# Patient Record
Sex: FEMALE | Race: WHITE | NOT HISPANIC OR LATINO | Employment: OTHER | URBAN - METROPOLITAN AREA
[De-identification: names, ages, dates, MRNs, and addresses within clinical notes are randomized per-mention and may not be internally consistent; named-entity substitution may affect disease eponyms.]

---

## 2017-03-01 ENCOUNTER — MEDICAL CORRESPONDENCE (OUTPATIENT)
Dept: HEALTH INFORMATION MANAGEMENT | Facility: CLINIC | Age: 72
End: 2017-03-01

## 2017-03-02 ENCOUNTER — TRANSFERRED RECORDS (OUTPATIENT)
Dept: HEALTH INFORMATION MANAGEMENT | Facility: CLINIC | Age: 72
End: 2017-03-02

## 2017-03-02 ENCOUNTER — MEDICAL CORRESPONDENCE (OUTPATIENT)
Dept: HEALTH INFORMATION MANAGEMENT | Facility: CLINIC | Age: 72
End: 2017-03-02

## 2017-03-07 ENCOUNTER — TRANSFERRED RECORDS (OUTPATIENT)
Dept: HEALTH INFORMATION MANAGEMENT | Facility: CLINIC | Age: 72
End: 2017-03-07

## 2017-03-16 ENCOUNTER — OFFICE VISIT (OUTPATIENT)
Dept: FAMILY MEDICINE | Facility: CLINIC | Age: 72
End: 2017-03-16
Payer: MEDICARE

## 2017-03-16 VITALS
HEART RATE: 75 BPM | TEMPERATURE: 99.1 F | DIASTOLIC BLOOD PRESSURE: 76 MMHG | BODY MASS INDEX: 30.81 KG/M2 | WEIGHT: 188 LBS | SYSTOLIC BLOOD PRESSURE: 118 MMHG | OXYGEN SATURATION: 96 % | RESPIRATION RATE: 16 BRPM

## 2017-03-16 DIAGNOSIS — R07.0 THROAT PAIN: Primary | ICD-10-CM

## 2017-03-16 DIAGNOSIS — J31.0 CHRONIC RHINITIS: ICD-10-CM

## 2017-03-16 PROCEDURE — 99213 OFFICE O/P EST LOW 20 MIN: CPT | Performed by: FAMILY MEDICINE

## 2017-03-16 RX ORDER — POLYETHYLENE GLYCOL 3350 17 G/17G
POWDER, FOR SOLUTION ORAL
Refills: 11 | COMMUNITY
Start: 2017-01-20

## 2017-03-16 NOTE — PROGRESS NOTES
SUBJECTIVE:                                                    Nohemy Gurrola is a 71 year old female who presents to clinic today for the following health issues:    Acute Illness   Acute illness concerns: sore throat, post nasal drip/irritation, occasional sneezing  Onset: x few weeks    Fever: no    Chills/Sweats: no    Headache (location?): no    Sinus Pressure: a lot of post nasal drip    Conjunctivitis:  no    Ear Pain: no    Rhinorrhea: no    Congestion: no    Sore Throat: YES     Cough: no    Wheeze: no    Decreased Appetite: no    Nausea: no    Vomiting: no    Diarrhea:  no    Dysuria/Freq.: no    Fatigue/Achiness: YES- some fatigue    Sick/Strep Exposure: no     Therapies Tried and outcome: Flonase - helps at night, mucous relief, antihistamine     [facility].  She takes it at night and feels it helps a bit but overall she still has sore throat.   No sinus pain.    Problem list and histories reviewed & adjusted, as indicated.  Additional history: as documented    Patient Active Problem List   Diagnosis     Primary biliary cholangitis (H)     Ovarian cancer (H)     Major depression     Insomnia     Hypertension     Hyperlipidemia     Heartburn     Past Surgical History   Procedure Laterality Date     As total abdom hysterectomy  1985     for endometriosis and fibroids     Salpingo-oophorectomy bilateral Bilateral 1985     Cholecystectomy  2004     Laparotomy exploratory  2004     for ovarian cancer     Laparoscopy  2009     for recurrent ovarian cancer     Open reduction internal fixation ankle Left 1980       Social History   Substance Use Topics     Smoking status: Never Smoker     Smokeless tobacco: Never Used     Alcohol use No     Family History   Problem Relation Age of Onset     Bipolar Disorder Mother      Suicide Mother          BP Readings from Last 3 Encounters:   03/16/17 118/76   09/03/16 116/74    Wt Readings from Last 3 Encounters:   03/16/17 188 lb (85.3 kg)    09/03/16 198 lb (89.8 kg)              Reviewed and updated as needed this visit by clinical staff  Tobacco  Allergies  Meds  Problems  Med Hx  Surg Hx  Fam Hx  Soc Hx        Reviewed and updated as needed this visit by Provider  Allergies  Meds  Problems  Med Hx  Surg Hx  Fam Hx         ROS:  ALL: No itchy eyes/nose/ears    OBJECTIVE:                                                    /76 (BP Location: Right arm, Patient Position: Chair, Cuff Size: Adult Regular)  Pulse 75  Temp 99.1  F (37.3  C) (Oral)  Resp 16  Wt 188 lb (85.3 kg)  SpO2 96%  BMI 30.81 kg/m2  Body mass index is 30.81 kg/(m^2).  GEN:  no apparent distress  EYES: PERRL, conjunctivae and sclerae clear  ENT: external ears and nose without lesions or scars, TM's and canals clear bilaterally, oropharynx with post-nasal drainage, no tonsillar enlargement, erythema, or exudate, and with moist mucus membranes and normal landmarks, nasal mucosa appears red and swollen   NECK:  Supple without adenopathy, mass, or thyromegaly  LUNGS:  normal respiratory effort, and lungs clear to auscultation bilaterally - no rales, rhonchi or wheezes  CV: regular rate and rhythm, normal S1 S2, no S3 or S4 and no murmur, click or rub     Diagnostic Test Results:  none      ASSESSMENT/PLAN:                                                            ICD-10-CM    1. Throat pain R07.0    2. Chronic rhinitis J31.0       Discussed that this this is still likely 2/2 chronic rhinitis with post-nasal drainage.  No evidence for strep or sinus infection.  Recommended she increase flonase use to 1 spray in each nostril BID.  If symptoms persist consider ENT referral - either through [facility] or in community.      Lalita Mendoza MD  [facility]

## 2017-03-16 NOTE — MR AVS SNAPSHOT
After Visit Summary   3/16/2017    Nohemy Gurrola    MRN: [id number]           Patient Information     Date Of Birth          1945        Visit Information        Provider Department      3/16/2017 3:00 PM Lalita Mendoza MD [facility]        Today's Diagnoses     Throat pain    -  1    Chronic rhinitis           Follow-ups after your visit        Your next 10 appointments already scheduled     Mar 30, 2017  1:00 PM CDT   (Arrive by 12:45 PM)   New Patient Visit with Evi Boyd MD   [facility] ([facility])    [address] [place] [id number] [phone]              Who to contact     If you have questions or need follow up information about today's clinic visit or your schedule please contact [facility] directly at [phone].  Normal or non-critical lab and imaging results will be communicated to you by MyChart, letter or phone within 4 business days after the clinic has received the results. If you do not hear from us within 7 days, please contact the clinic through MyChart or phone. If you have a critical or abnormal lab result, we will notify you by phone as soon as possible.  Submit refill requests through [employer] or call your pharmacy and they will forward the refill request to us. Please allow 3 business days for your refill to be completed.          Additional Information About Your Visit        MyChart Information     [employer] gives you secure access to your electronic health record. If you see a primary care provider, you can also send messages to your care team and make appointments. If you have questions, please call your primary care clinic.  If you do not have a primary care provider, please call [phone] and they will assist you.        Care EveryWhere ID     This is your Care EveryWhere ID. This could be used by other organizations to access your [place]  medical records  [id number]        Your Vitals Were     Pulse Temperature Respirations Pulse Oximetry BMI (Body Mass Index)       75 99.1  F (37.3  C) (Oral) 16 96% 30.81 kg/m2        Blood Pressure from Last 3 Encounters:   03/16/17 118/76   09/03/16 116/74    Weight from Last 3 Encounters:   03/16/17 188 lb (85.3 kg)   09/03/16 198 lb (89.8 kg)              Today, you had the following     No orders found for display       Primary Care Provider    Provider Outside       No address on file        Thank you!     Thank you for choosing [facility]  for your care. Our goal is always to provide you with excellent care. Hearing back from our patients is one way we can continue to improve our services. Please take a few minutes to complete the written survey that you may receive in the mail after your visit with us. Thank you!             Your Updated Medication List - Protect others around you: Learn how to safely use, store and throw away your medicines at www.disposemymeds.org.          This list is accurate as of: 3/16/17  3:45 PM.  Always use your most recent med list.                   Brand Name Dispense Instructions for use    AMBIEN PO      Take 12 mg by mouth       ASPIRIN PO      Take 81 mg by mouth daily       ATORVASTATIN CALCIUM PO      Take 10 mg by mouth       EFFEXOR PO      Take 150 mg by mouth 3 times daily       LOSARTAN POTASSIUM PO      Take 100 mg by mouth       multivitamin, therapeutic Tabs tablet      Take 1 tablet by mouth daily       omeprazole 20 MG CR capsule    priLOSEC         polyethylene glycol powder    MIRALAX/GLYCOLAX     TK 17 GRAMS DISSOLVED IN 6 OUNCES WATER ONCE D PRF CONSTIPATION       URSODIOL PO      Take by mouth 2 times daily       VITAMIN D3 PO      Take by mouth daily

## 2017-03-16 NOTE — Clinical Note
Please abstract the following data from this visit with this patient into the appropriate field in Epic:  Colonoscopy done on this date: 2014 (approximately), by this group: Dr Sohan Armas [place], results were.  Mammogram done on this date: 2016 (approximately), by this group: [facility], results were normal.   TYLER signed by patient, faxed to both facilities to have records sent to .

## 2017-03-30 ENCOUNTER — ONCOLOGY VISIT (OUTPATIENT)
Dept: ONCOLOGY | Facility: CLINIC | Age: 72
End: 2017-03-30
Attending: OBSTETRICS & GYNECOLOGY
Payer: MEDICARE

## 2017-03-30 VITALS
WEIGHT: 184.8 LBS | HEIGHT: 66 IN | HEART RATE: 71 BPM | RESPIRATION RATE: 16 BRPM | DIASTOLIC BLOOD PRESSURE: 80 MMHG | TEMPERATURE: 97.9 F | BODY MASS INDEX: 29.7 KG/M2 | SYSTOLIC BLOOD PRESSURE: 155 MMHG | OXYGEN SATURATION: 98 %

## 2017-03-30 DIAGNOSIS — C56.9 OVARIAN CANCER, UNSPECIFIED LATERALITY (H): Primary | ICD-10-CM

## 2017-03-30 DIAGNOSIS — K74.3 PRIMARY BILIARY CHOLANGITIS (H): ICD-10-CM

## 2017-03-30 DIAGNOSIS — C56.9 OVARIAN CANCER, UNSPECIFIED LATERALITY (H): ICD-10-CM

## 2017-03-30 LAB — CANCER AG125 SERPL-ACNC: 16 U/ML (ref 0–30)

## 2017-03-30 PROCEDURE — 86304 IMMUNOASSAY TUMOR CA 125: CPT | Performed by: OBSTETRICS & GYNECOLOGY

## 2017-03-30 PROCEDURE — 99212 OFFICE O/P EST SF 10 MIN: CPT | Mod: ZF

## 2017-03-30 PROCEDURE — 36415 COLL VENOUS BLD VENIPUNCTURE: CPT | Performed by: OBSTETRICS & GYNECOLOGY

## 2017-03-30 PROCEDURE — 99205 OFFICE O/P NEW HI 60 MIN: CPT | Mod: ZP | Performed by: OBSTETRICS & GYNECOLOGY

## 2017-03-30 ASSESSMENT — ENCOUNTER SYMPTOMS
MUSCLE WEAKNESS: 0
NECK PAIN: 0
ARTHRALGIAS: 1
SMELL DISTURBANCE: 0
TASTE DISTURBANCE: 0
JOINT SWELLING: 1
SKIN CHANGES: 0
NAIL CHANGES: 1
SORE THROAT: 1
NECK MASS: 0
SINUS PAIN: 0
MUSCLE CRAMPS: 0
MYALGIAS: 0
POOR WOUND HEALING: 0
BACK PAIN: 0
HOARSE VOICE: 0
STIFFNESS: 1
TROUBLE SWALLOWING: 1
SINUS CONGESTION: 1

## 2017-03-30 ASSESSMENT — PAIN SCALES - GENERAL: PAINLEVEL: NO PAIN (0)

## 2017-03-30 NOTE — PROGRESS NOTES
Consult Notes on Referred Patient    Date: 3/30/2017        [facility]  [address] [place] [address]       RE: Nohemy Gurrola  : 1945  LORENA: 3/30/2017     Dear  [facility]:    I had the pleasure of seeing your patient Nohemy Gurrola here at the Gynecologic Cancer Clinic at the [facility] on 3/30/2017.  As you know she is a very pleasant 71 year old woman with a history of recurrent stage IIIc ovarian cancer.  Given these findings she was subsequently sent to the Gynecologic Cancer Clinic for new patient consultation.     Brief Oncology History:    Nohemy is a 71 year old woman who just moved back to [place] from [place], [place] early December. She is originally from [place] and moved back to be closer to friends and family. She had an open TLH-BSO in  due to endometriosis and was on premarin for 19 years until  when she was taken off. She was originally diagnosed with stage IIIc ovarian cancer in  - timeline below. She takes a baby aspirin daily. No fx of ovarian or breast cancer. No hx of diabetes, heart or lung disease. She is an only child and does not have any children. She has OA in her knees and has a knee replacement scheduled for May 2nd, 2017. She continues to take miralax for radiation proctitis.      - dianosed with stage IIIc ovarian cancer  - presented with blood clot, CT scan revealed right sided pelvic mass measuring 7 x 6 cm with obstruction of ureter.   - CEA = 106  - November: removal of pelvic mass, 2/5 lymph nodes revealed metastatic adenocarcinoma, no lymphvascular space invasion   - treated surgically and with chemo (carbo/taxol) with Dr. More in [place].   -initial  = 1348    [id number]: disease free    2009:  increased to 18.2    2009: interval increase in size of mass. Dr. More was worried that disease had recurred.     2009: diagnostic laparoscopy revealed extensive  "adhesions, underwent debulking and additional carbo/taxol plus radiation to pelvis   7/16/09: MRI pelvis  Impression:  1. Right sided 3.3 cm pelvic mass worrisome for recurrent disease  2. Solitary prominent 13mm right common iliac artery lymph node  7/24/09: right pelvic sidewall mass - biopsy revealed adipose tissue with metastatic adenocarcinoma consistent with given clinical history of patient's carcinoma of ovary.     Summer 2016: had severe UTI and e. Coli infection. CT scan done in [place] which revealed \"small spot\" in pelvis, not representative of recurrent disease.  was 10.6 at this time    December 2016: Moved back to [place]    [facility] which is higher than usual for her    3/7/2017: CT CAP  Impression:  1. No evidence of residual/recurrent disease in chest, abdomen and pelvis.   2. 1.3 cm nodule left lobe of thyroid which may be evaluated by US thyroid if not done in the past.     Today: Nohemy is feeling well. She is concerned regarding her rising  levels as that has always been a good marker for her. She is otherwise in good health. Normal bowel movements. No dysuria, urinary urgency, frequency or incontinence. No abdominal or pelvic pain. No chest pain or SOB. No fever/chilld. No nausea/vomiting. She is eating and drinking well.          Date Value Ref Range Status   03/30/2017 16 0 - 30 U/mL Final     Comment:     Assay Method:  Chemiluminescence using Siemens Centaur XP       Answers for HPI/ROS submitted by the patient on 3/30/2017   General Symptoms: No  Skin Symptoms: Yes  HENT Symptoms: Yes  EYE SYMPTOMS: No  HEART SYMPTOMS: No  LUNG SYMPTOMS: No  INTESTINAL SYMPTOMS: No  URINARY SYMPTOMS: No  GYNECOLOGIC SYMPTOMS: No  BREAST SYMPTOMS: No  SKELETAL SYMPTOMS: Yes  BLOOD SYMPTOMS: No  NERVOUS SYSTEM SYMPTOMS: No  MENTAL HEALTH SYMPTOMS: No  Changes in hair: No  Changes in moles/birth marks: No  Itching: No  Rashes: No  Changes in nails: Yes  Acne: No  Hair " in places you don't want it: No  Change in facial hair: No  Warts: No  Non-healing sores: No  Scarring: No  Flaking of skin: No  Color changes of hands/feet in cold : No  Sun sensitivity: No  Skin thickening: No  Ear pain: No  Ear discharge: No  Hearing loss: No  Tinnitus: No  Nosebleeds: No  Congestion: Yes  Sinus pain: No  Trouble swallowing: Yes   Voice hoarseness: No  Mouth sores: No  Sore throat: Yes  Tooth pain: No  Bleeding gums: No  Change in taste: No  Change in sense of smell: No  Dry mouth: Yes  Hearing aid used: No  Neck lump: No  Back pain: No  Muscle aches: No  Neck pain: No  Swollen joints: Yes  Joint pain: Yes  Bone pain: No  Muscle cramps: No  Muscle weakness: No  Joint stiffness: Yes  Bone fracture: No    I have reviewed and addressed the patient's review of symptoms for today's visit.     Past Medical History:    Past Medical History:   Diagnosis Date     DVT (deep vein thrombosis) in pregnancy (H) 2004    left saphenous vein     Endometriosis      Heartburn      Hemorrhoids      Hyperlipidemia      Hypertension      Insomnia      Major depression      Ovarian cancer (H) 11/2004 and recurred 7/2009     Primary biliary cholangitis (H)          Past Surgical History:    Past Surgical History:   Procedure Laterality Date     AS TOTAL ABDOM HYSTERECTOMY  1985    for endometriosis and fibroids     CHOLECYSTECTOMY  2004     LAPAROSCOPY  2009    for recurrent ovarian cancer     LAPAROTOMY EXPLORATORY  2004    for ovarian cancer     OPEN REDUCTION INTERNAL FIXATION ANKLE Left 1980     SALPINGO-OOPHORECTOMY BILATERAL Bilateral 1985         Health Maintenance:  Health Maintenance Due   Topic Date Due     TETANUS IMMUNIZATION (SYSTEM ASSIGNED)  12/29/1963     ADVANCE DIRECTIVE PLANNING Q5 YRS (NO INBASKET)  12/29/1963     HEPATITIS C SCREENING  12/29/1963     LIPID SCREEN Q5 YR FEMALE (SYSTEM ASSIGNED)  12/29/1990     DEXA SCAN SCREENING (SYSTEM ASSIGNED)  12/29/2010     PNEUMOCOCCAL (1 of 2 - PCV13)  "12/29/2010     MAMMO SCREEN Q2 YR (SYSTEM ASSIGNED)  01/01/2016       Last Pap Smear:     Last Mammogram: 10/2016              Result: normal      She has not had a history of abnormal mammograms.    Last Colonoscopy: Within last 5 years           Result: normal                        Current Medications:     has a current medication list which includes the following prescription(s): omeprazole, polyethylene glycol, ursodiol, venlafaxine hcl, losartan potassium, atorvastatin calcium, aspirin, zolpidem tartrate, cholecalciferol, and multivitamin, therapeutic.       Allergies:        Allergies   Allergen Reactions     Ciprofloxacin Other (See Comments)     Pain in legs and feet     Adhesive Tape      Perfume            Social History:     Social History   Substance Use Topics     Smoking status: Never Smoker     Smokeless tobacco: Never Used     Alcohol use No       History   Drug Use No           Family History:     The patient's family history is notable for none.    Family History   Problem Relation Age of Onset     Bipolar Disorder Mother      Suicide Mother          Physical Exam:     /80 (BP Location: Left arm, Patient Position: Chair, Cuff Size: Adult Regular)  Pulse 71  Temp 97.9  F (36.6  C) (Oral)  Resp 16  Ht 1.664 m (5' 5.51\")  Wt 83.8 kg (184 lb 12.8 oz)  SpO2 98%  BMI 30.27 kg/m2  Body mass index is 30.27 kg/(m^2).    General Appearance: healthy and alert, no distress     HEENT:  no thyromegaly, no palpable nodules or masses        Cardiovascular: regular rate and rhythm, no gallops, rubs or murmurs     Respiratory: lungs clear, no rales, rhonchi or wheezes, normal diaphragmatic excursion    Musculoskeletal: extremities non tender and without edema    Skin: no lesions or rashes     Neurological: normal gait, no gross defects     Psychiatric: appropriate mood and affect                               Hematological: normal cervical, supraclavicular and inguinal lymph nodes     Gastrointestinal: "       abdomen soft, non-tender, non-distended, no organomegaly or masses, RLQ scar from appendectomy, vertical midline scar     Genitourinary: External genitalia and urethral meatus appears normal.  Vagina is smooth without nodularity or masses.  Cervix surgically absent.  Bimanual exam reveal no masses, nodularity or fullness.  Recto-vaginal exam confirms these findings.      Assessment:    Nohemy Gurrola is a 71 year old woman with a history of stage IIIc ovarian cancer.       Plan:     1.)    RTC to see me in 1 year for surveillance visit as  lower than previously.     2.) Genetic risk factors were assessed and genetic counseling was discussed but patient declined at this time.     3.) Labs and/or tests ordered include:  [facility].     4.) Health maintenance issues addressed today include none.      Thank you for allowing us to participate in the care of your patient.         Sincerely,    Evi Boyd MD    Department of Ob/Gyn and Women's Health  Division of Gynecologic Oncology  [facility]  [phone]      Patient Care Team:  Outside, Provider as PCP - [facility]    I have reviewed the above note and agree with the scribe's notation as written.    I, Nick Singh, am serving as a scribe to document services personally performed by Evi Boyd MD, based upon my observations and the provider's statements to me. All documentation has been reviewed by the aforementioned doctor prior to being entered into the official medical record.

## 2017-03-30 NOTE — MR AVS SNAPSHOT
"              After Visit Summary   3/30/2017    Nohemy Gurrola    MRN: [id number]           Patient Information     Date Of Birth          1945        Visit Information        Provider Department      3/30/2017 1:00 PM Evi Boyd MD [facility]        Today's Diagnoses     Ovarian cancer, unspecified laterality (H)    -  1    Primary biliary cholangitis (H)           Follow-ups after your visit        Who to contact     If you have questions or need follow up information about today's clinic visit or your schedule please contact [facility] directly at [phone].  Normal or non-critical lab and imaging results will be communicated to you by MyChart, letter or phone within 4 business days after the clinic has received the results. If you do not hear from us within 7 days, please contact the clinic through [employer] or phone. If you have a critical or abnormal lab result, we will notify you by phone as soon as possible.  Submit refill requests through [employer] or call your pharmacy and they will forward the refill request to us. Please allow 3 business days for your refill to be completed.          Additional Information About Your Visit        MyChart Information     [employer] gives you secure access to your electronic health record. If you see a primary care provider, you can also send messages to your care team and make appointments. If you have questions, please call your primary care clinic.  If you do not have a primary care provider, please call [phone] and they will assist you.        Care EveryWhere ID     This is your Care EveryWhere ID. This could be used by other organizations to access your [place] medical records  [id number]        Your Vitals Were     Pulse Temperature Respirations Height Pulse Oximetry BMI (Body Mass Index)    71 97.9  F (36.6  C) (Oral) 16 1.664 m (5' 5.51\") 98% 30.27 kg/m2       Blood Pressure from Last 3 Encounters: "   03/30/17 155/80   03/16/17 118/76   09/03/16 116/74    Weight from Last 3 Encounters:   03/30/17 83.8 kg (184 lb 12.8 oz)   03/16/17 85.3 kg (188 lb)   09/03/16 89.8 kg (198 lb)               Primary Care Provider    Provider Outside       No address on file        Thank you!     Thank you for choosing [facility]  for your care. Our goal is always to provide you with excellent care. Hearing back from our patients is one way we can continue to improve our services. Please take a few minutes to complete the written survey that you may receive in the mail after your visit with us. Thank you!             Your Updated Medication List - Protect others around you: Learn how to safely use, store and throw away your medicines at www.disposemymeds.org.          This list is accurate as of: 3/30/17 11:59 PM.  Always use your most recent med list.                   Brand Name Dispense Instructions for use    AMBIEN PO      Take 12 mg by mouth At Bedtime       ASPIRIN PO      Take 81 mg by mouth daily       ATORVASTATIN CALCIUM PO      Take 10 mg by mouth       EFFEXOR PO      Take 150 mg by mouth daily       LOSARTAN POTASSIUM PO      Take 100 mg by mouth       multivitamin, therapeutic Tabs tablet      Take 1 tablet by mouth daily Reported on 3/30/2017       omeprazole 20 MG CR capsule    priLOSEC     Take 20 mg by mouth daily       polyethylene glycol powder    MIRALAX/GLYCOLAX     TK 17 GRAMS DISSOLVED IN 6 OUNCES WATER ONCE D PRF CONSTIPATION       URSODIOL PO      Take by mouth 2 times daily       VITAMIN D3 PO      Take by mouth daily

## 2017-03-30 NOTE — NURSING NOTE
"Nohemy Gurrola is a 71 year old female who presents for:  Chief Complaint   Patient presents with     Oncology Clinic Visit     Ovarian Ca - NEW- CT scan review        Initial Vitals:  /80 (BP Location: Left arm, Patient Position: Chair, Cuff Size: Adult Regular)  Pulse 71  Temp 97.9  F (36.6  C) (Oral)  Resp 16  Ht 1.664 m (5' 5.51\")  Wt 83.8 kg (184 lb 12.8 oz)  SpO2 98%  BMI 30.27 kg/m2 Estimated body mass index is 30.27 kg/(m^2) as calculated from the following:    Height as of this encounter: 1.664 m (5' 5.51\").    Weight as of this encounter: 83.8 kg (184 lb 12.8 oz).. Body surface area is 1.97 meters squared. BP completed using cuff size: regular  No Pain (0) No LMP recorded. Patient has had a hysterectomy. Allergies and medications reviewed.     Medications: Medication refills not needed today.  Pharmacy name entered into [employer]: [id number] - [place], [place] - [address] AT [facility] [address]    Comments:Review Ct scans    7 minutes for nursing intake (face to face time)   Irene Borges LPN      "

## 2017-03-30 NOTE — LETTER
3/30/2017       RE: Nohemy Gurrola  [address]    [place] [address]     Dear Colleague,    Thank you for referring your patient, Nohemy Gurrola, to the [facility]. Please see a copy of my visit note below.                                    Consult Notes on Referred Patient    Date: 3/30/2017        [facility]  [address] [place] [address]       RE: Nohemy Gurrola  : 1945  LORENA: 3/30/2017     Dear  [facility]:    I had the pleasure of seeing your patient Nohemy Gurrola here at the Gynecologic Cancer Clinic at the [facility] on 3/30/2017.  As you know she is a very pleasant 71 year old woman with a history of recurrent stage IIIc ovarian cancer.  Given these findings she was subsequently sent to the Gynecologic Cancer Clinic for new patient consultation.     Brief Oncology History:    Nohemy is a 71 year old woman who just moved back to [place] from [place], [place] early December. She is originally from [place] and moved back to be closer to friends and family. She had an open TLH-BSO in  due to endometriosis and was on premarin for 19 years until  when she was taken off. She was originally diagnosed with stage IIIc ovarian cancer in  - timeline below. She takes a baby aspirin daily. No fx of ovarian or breast cancer. No hx of diabetes, heart or lung disease. She is an only child and does not have any children. She has OA in her knees and has a knee replacement scheduled for May 2nd, 2017. She continues to take miralax for radiation proctitis.      - dianosed with stage IIIc ovarian cancer  - presented with blood clot, CT scan revealed right sided pelvic mass measuring 7 x 6 cm with obstruction of ureter.   - CEA = 106  - November: removal of pelvic mass, 2/5 lymph nodes revealed metastatic adenocarcinoma, no lymphvascular space invasion   - treated surgically and with chemo (carbo/taxol) with Dr. More in  "[place].   -initial  = 1348    [id number]: disease free    January 2009:  increased to 18.2    June 2009: interval increase in size of mass. Dr. More was worried that disease had recurred.     July 2009: diagnostic laparoscopy revealed extensive adhesions, underwent debulking and additional carbo/taxol plus radiation to pelvis   7/16/09: MRI pelvis  Impression:  1. Right sided 3.3 cm pelvic mass worrisome for recurrent disease  2. Solitary prominent 13mm right common iliac artery lymph node  7/24/09: right pelvic sidewall mass - biopsy revealed adipose tissue with metastatic adenocarcinoma consistent with given clinical history of patient's carcinoma of ovary.     Summer 2016: had severe UTI and e. Coli infection. CT scan done in [place] which revealed \"small spot\" in pelvis, not representative of recurrent disease.  was 10.6 at this time    December 2016: Moved back to [place]    [facility] which is higher than usual for her    3/7/2017: CT CAP  Impression:  1. No evidence of residual/recurrent disease in chest, abdomen and pelvis.   2. 1.3 cm nodule left lobe of thyroid which may be evaluated by US thyroid if not done in the past.     Today: Nohemy is feeling well. She is concerned regarding her rising  levels as that has always been a good marker for her. She is otherwise in good health. Normal bowel movements. No dysuria, urinary urgency, frequency or incontinence. No abdominal or pelvic pain. No chest pain or SOB. No fever/chilld. No nausea/vomiting. She is eating and drinking well.          Date Value Ref Range Status   03/30/2017 16 0 - 30 U/mL Final     Comment:     Assay Method:  Chemiluminescence using Siemens Centaur XP       Answers for HPI/ROS submitted by the patient on 3/30/2017   General Symptoms: No  Skin Symptoms: Yes  HENT Symptoms: Yes  EYE SYMPTOMS: No  HEART SYMPTOMS: No  LUNG SYMPTOMS: No  INTESTINAL SYMPTOMS: No  URINARY SYMPTOMS: " No  GYNECOLOGIC SYMPTOMS: No  BREAST SYMPTOMS: No  SKELETAL SYMPTOMS: Yes  BLOOD SYMPTOMS: No  NERVOUS SYSTEM SYMPTOMS: No  MENTAL HEALTH SYMPTOMS: No  Changes in hair: No  Changes in moles/birth marks: No  Itching: No  Rashes: No  Changes in nails: Yes  Acne: No  Hair in places you don't want it: No  Change in facial hair: No  Warts: No  Non-healing sores: No  Scarring: No  Flaking of skin: No  Color changes of hands/feet in cold : No  Sun sensitivity: No  Skin thickening: No  Ear pain: No  Ear discharge: No  Hearing loss: No  Tinnitus: No  Nosebleeds: No  Congestion: Yes  Sinus pain: No  Trouble swallowing: Yes   Voice hoarseness: No  Mouth sores: No  Sore throat: Yes  Tooth pain: No  Bleeding gums: No  Change in taste: No  Change in sense of smell: No  Dry mouth: Yes  Hearing aid used: No  Neck lump: No  Back pain: No  Muscle aches: No  Neck pain: No  Swollen joints: Yes  Joint pain: Yes  Bone pain: No  Muscle cramps: No  Muscle weakness: No  Joint stiffness: Yes  Bone fracture: No    I have reviewed and addressed the patient's review of symptoms for today's visit.     Past Medical History:    Past Medical History:   Diagnosis Date     DVT (deep vein thrombosis) in pregnancy (H) 2004    left saphenous vein     Endometriosis      Heartburn      Hemorrhoids      Hyperlipidemia      Hypertension      Insomnia      Major depression      Ovarian cancer (H) 11/2004 and recurred 7/2009     Primary biliary cholangitis (H)          Past Surgical History:    Past Surgical History:   Procedure Laterality Date     AS TOTAL ABDOM HYSTERECTOMY  1985    for endometriosis and fibroids     CHOLECYSTECTOMY  2004     LAPAROSCOPY  2009    for recurrent ovarian cancer     LAPAROTOMY EXPLORATORY  2004    for ovarian cancer     OPEN REDUCTION INTERNAL FIXATION ANKLE Left 1980     SALPINGO-OOPHORECTOMY BILATERAL Bilateral 1985         Health Maintenance:  Health Maintenance Due   Topic Date Due     TETANUS IMMUNIZATION (SYSTEM ASSIGNED)  " 12/29/1963     ADVANCE DIRECTIVE PLANNING Q5 YRS (NO INBASKET)  12/29/1963     HEPATITIS C SCREENING  12/29/1963     LIPID SCREEN Q5 YR FEMALE (SYSTEM ASSIGNED)  12/29/1990     DEXA SCAN SCREENING (SYSTEM ASSIGNED)  12/29/2010     PNEUMOCOCCAL (1 of 2 - PCV13) 12/29/2010     MAMMO SCREEN Q2 YR (SYSTEM ASSIGNED)  01/01/2016       Last Pap Smear:     Last Mammogram: 10/2016              Result: normal      She has not had a history of abnormal mammograms.    Last Colonoscopy: Within last 5 years           Result: normal                        Current Medications:     has a current medication list which includes the following prescription(s): omeprazole, polyethylene glycol, ursodiol, venlafaxine hcl, losartan potassium, atorvastatin calcium, aspirin, zolpidem tartrate, cholecalciferol, and multivitamin, therapeutic.       Allergies:        Allergies   Allergen Reactions     Ciprofloxacin Other (See Comments)     Pain in legs and feet     Adhesive Tape      Perfume            Social History:     Social History   Substance Use Topics     Smoking status: Never Smoker     Smokeless tobacco: Never Used     Alcohol use No       History   Drug Use No           Family History:     The patient's family history is notable for none.    Family History   Problem Relation Age of Onset     Bipolar Disorder Mother      Suicide Mother          Physical Exam:     /80 (BP Location: Left arm, Patient Position: Chair, Cuff Size: Adult Regular)  Pulse 71  Temp 97.9  F (36.6  C) (Oral)  Resp 16  Ht 1.664 m (5' 5.51\")  Wt 83.8 kg (184 lb 12.8 oz)  SpO2 98%  BMI 30.27 kg/m2  Body mass index is 30.27 kg/(m^2).    General Appearance: healthy and alert, no distress     HEENT:  no thyromegaly, no palpable nodules or masses        Cardiovascular: regular rate and rhythm, no gallops, rubs or murmurs     Respiratory: lungs clear, no rales, rhonchi or wheezes, normal diaphragmatic excursion    Musculoskeletal: extremities non tender and " without edema    Skin: no lesions or rashes     Neurological: normal gait, no gross defects     Psychiatric: appropriate mood and affect                               Hematological: normal cervical, supraclavicular and inguinal lymph nodes     Gastrointestinal:       abdomen soft, non-tender, non-distended, no organomegaly or masses, RLQ scar from appendectomy, vertical midline scar     Genitourinary: External genitalia and urethral meatus appears normal.  Vagina is smooth without nodularity or masses.  Cervix surgically absent.  Bimanual exam reveal no masses, nodularity or fullness.  Recto-vaginal exam confirms these findings.      Assessment:    Nohemy Gurrola is a 71 year old woman with a history of stage IIIc ovarian cancer.       Plan:     1.)    RTC to see me in 1 year for surveillance visit as  lower than previously.     2.) Genetic risk factors were assessed and genetic counseling was discussed but patient declined at this time.     3.) Labs and/or tests ordered include:  [facility].     4.) Health maintenance issues addressed today include none.      Thank you for allowing us to participate in the care of your patient.         Sincerely,    Evi Boyd MD    Department of Ob/Gyn and Women's Health  Division of Gynecologic Oncology  [facility]  [phone]    CC  Patient Care Team:  Outside, Provider as PCP - [facility] [place]    I have reviewed the above note and agree with the scribe's notation as written.    I, Nick Singh, am serving as a scribe to document services personally performed by Evi Boyd MD, based upon my observations and the provider's statements to me. All documentation has been reviewed by the aforementioned doctor prior to being entered into the official medical record.       Again, thank you for allowing me to participate in the care of your patient.      Sincerely,    Evi Martínez  MD [place]

## 2017-03-31 ENCOUNTER — HOSPITAL ENCOUNTER (INPATIENT)
Dept: GENERAL RADIOLOGY | Facility: CLINIC | Age: 72
End: 2017-03-31
Attending: OBSTETRICS & GYNECOLOGY

## 2017-04-11 PROCEDURE — 00000346 ZZHCL STATISTIC REVIEW OUTSIDE SLIDES TC 88321: Performed by: OBSTETRICS & GYNECOLOGY

## 2017-04-12 LAB — COPATH REPORT: NORMAL

## 2017-05-09 ENCOUNTER — AMBULATORY - HEALTHEAST (OUTPATIENT)
Dept: ADMINISTRATIVE | Facility: CLINIC | Age: 72
End: 2017-05-09

## 2017-05-09 ENCOUNTER — OFFICE VISIT - HEALTHEAST (OUTPATIENT)
Dept: GERIATRICS | Facility: CLINIC | Age: 72
End: 2017-05-09

## 2017-05-09 DIAGNOSIS — E78.5 HYPERLIPIDEMIA: ICD-10-CM

## 2017-05-09 DIAGNOSIS — R52 PAIN MANAGEMENT: ICD-10-CM

## 2017-05-09 DIAGNOSIS — F32.A DEPRESSION: ICD-10-CM

## 2017-05-09 DIAGNOSIS — Z96.651 HISTORY OF TOTAL KNEE ARTHROPLASTY, RIGHT: ICD-10-CM

## 2017-05-12 ENCOUNTER — OFFICE VISIT - HEALTHEAST (OUTPATIENT)
Dept: GERIATRICS | Facility: CLINIC | Age: 72
End: 2017-05-12

## 2017-05-12 DIAGNOSIS — Z82.69 FH: TOTAL KNEE REPLACEMENT: ICD-10-CM

## 2017-05-12 DIAGNOSIS — M17.9 OA (OSTEOARTHRITIS) OF KNEE: ICD-10-CM

## 2017-05-12 DIAGNOSIS — K21.9 GERD (GASTROESOPHAGEAL REFLUX DISEASE): ICD-10-CM

## 2017-05-12 DIAGNOSIS — K74.3 PRIMARY BILIARY CHOLANGITIS (H): ICD-10-CM

## 2017-05-12 DIAGNOSIS — F32.A DEPRESSION: ICD-10-CM

## 2017-05-12 DIAGNOSIS — R52 PAIN MANAGEMENT: ICD-10-CM

## 2017-05-12 DIAGNOSIS — I10 ESSENTIAL HYPERTENSION: ICD-10-CM

## 2017-05-16 ENCOUNTER — OFFICE VISIT - HEALTHEAST (OUTPATIENT)
Dept: GERIATRICS | Facility: CLINIC | Age: 72
End: 2017-05-16

## 2017-05-16 DIAGNOSIS — K74.3 PRIMARY BILIARY CHOLANGITIS (H): ICD-10-CM

## 2017-05-16 DIAGNOSIS — Z82.69 FH: TOTAL KNEE REPLACEMENT: ICD-10-CM

## 2017-05-16 DIAGNOSIS — I10 ESSENTIAL HYPERTENSION: ICD-10-CM

## 2017-05-16 DIAGNOSIS — R52 PAIN MANAGEMENT: ICD-10-CM

## 2017-05-18 ENCOUNTER — AMBULATORY - HEALTHEAST (OUTPATIENT)
Dept: GERIATRICS | Facility: CLINIC | Age: 72
End: 2017-05-18

## 2017-06-15 ENCOUNTER — THERAPY VISIT (OUTPATIENT)
Dept: PHYSICAL THERAPY | Facility: CLINIC | Age: 72
End: 2017-06-15
Payer: MEDICARE

## 2017-06-15 DIAGNOSIS — Z96.651 AFTERCARE FOLLOWING RIGHT KNEE JOINT REPLACEMENT SURGERY: Primary | ICD-10-CM

## 2017-06-15 DIAGNOSIS — Z47.1 AFTERCARE FOLLOWING RIGHT KNEE JOINT REPLACEMENT SURGERY: Primary | ICD-10-CM

## 2017-06-15 PROCEDURE — G8978 MOBILITY CURRENT STATUS: HCPCS | Mod: GP | Performed by: PHYSICAL THERAPIST

## 2017-06-15 PROCEDURE — 97110 THERAPEUTIC EXERCISES: CPT | Mod: GP | Performed by: PHYSICAL THERAPIST

## 2017-06-15 PROCEDURE — G8979 MOBILITY GOAL STATUS: HCPCS | Mod: GP | Performed by: PHYSICAL THERAPIST

## 2017-06-15 PROCEDURE — 97161 PT EVAL LOW COMPLEX 20 MIN: CPT | Mod: GP | Performed by: PHYSICAL THERAPIST

## 2017-06-15 ASSESSMENT — ACTIVITIES OF DAILY LIVING (ADL)
STIFFNESS: THE SYMPTOM AFFECTS MY ACTIVITY SLIGHTLY
PAIN: I HAVE THE SYMPTOM BUT IT DOES NOT AFFECT MY ACTIVITY
SIT WITH YOUR KNEE BENT: ACTIVITY IS NOT DIFFICULT
LIMPING: I DO NOT HAVE THE SYMPTOM
GO UP STAIRS: ACTIVITY IS MINIMALLY DIFFICULT
AS_A_RESULT_OF_YOUR_KNEE_INJURY,_HOW_WOULD_YOU_RATE_YOUR_CURRENT_LEVEL_OF_DAILY_ACTIVITY?: NEARLY NORMAL
HOW_WOULD_YOU_RATE_THE_OVERALL_FUNCTION_OF_YOUR_KNEE_DURING_YOUR_USUAL_DAILY_ACTIVITIES?: NEARLY NORMAL
HOW_WOULD_YOU_RATE_THE_CURRENT_FUNCTION_OF_YOUR_KNEE_DURING_YOUR_USUAL_DAILY_ACTIVITIES_ON_A_SCALE_FROM_0_TO_100_WITH_100_BEING_YOUR_LEVEL_OF_KNEE_FUNCTION_PRIOR_TO_YOUR_INJURY_AND_0_BEING_THE_INABILITY_TO_PERFORM_ANY_OF_YOUR_USUAL_DAILY_ACTIVITIES?: 90
WEAKNESS: I HAVE THE SYMPTOM BUT IT DOES NOT AFFECT MY ACTIVITY
WALK: ACTIVITY IS NOT DIFFICULT
SQUAT: I AM UNABLE TO DO THE ACTIVITY
RAW_SCORE: 50
KNEE_ACTIVITY_OF_DAILY_LIVING_SUM: 50
STAND: ACTIVITY IS SOMEWHAT DIFFICULT
GIVING WAY, BUCKLING OR SHIFTING OF KNEE: I DO NOT HAVE THE SYMPTOM
GO DOWN STAIRS: ACTIVITY IS MINIMALLY DIFFICULT
KNEE_ACTIVITY_OF_DAILY_LIVING_SCORE: 71.43
KNEEL ON THE FRONT OF YOUR KNEE: I AM UNABLE TO DO THE ACTIVITY
RISE FROM A CHAIR: ACTIVITY IS MINIMALLY DIFFICULT
SWELLING: I HAVE THE SYMPTOM BUT IT DOES NOT AFFECT MY ACTIVITY

## 2017-06-15 NOTE — LETTER
DEPARTMENT OF HEALTH AND HUMAN SERVICES  CENTERS FOR MEDICARE & MEDICAID SERVICES    PLAN/UPDATED PLAN OF PROGRESS FOR OUTPATIENT REHABILITATION    PATIENTS NAME:  Nohemy Gurrola   : 1945  PROVIDER NUMBER:    [id number]  [facility] [id number]  PROVIDER NAME: [facility]  MEDICAL RECORD NUMBER: [id number]   START OF CARE DATE:  SOC Date: 06/15/17   TYPE:  PT  PRIMARY/TREATMENT DIAGNOSIS: (Pertinent Medical Diagnosis)  Aftercare following right knee joint replacement surgery  VISITS FROM START OF CARE:  Rxs Used: 1     Subjective:  Patient is a 71 year old female presenting with rehab right knee hpi.   Nohemy Gurrola is a 71 year old female with a right knee condition.  Condition occurred with:  Degenerative joint disease.  Condition occurred: for unknown reasons.  This is a chronic condition  17.  Patient reports multiple year history of B knee pain.  She underwent R TKA surgery on 17.  She was discharged from hospital to TCU after 3 nights.  She was discharged from TCU to home after 10 days.  She lives in apartment with elevator.  She intermittently uses cane for community ambulation..    Patient reports pain:  Lateral.  Radiates to:  Thigh.  Pain is described as other (tight) and is intermittent and reported as 1/10.  Associated symptoms:  Loss of motion/stiffness and edema. Pain is worse during the night.  Symptoms are exacerbated by walking, descending stairs, ascending stairs and bending/squatting and relieved by rest.  Since onset symptoms are gradually improving.  Special tests:  X-ray.  Previous treatment includes surgery and physical therapy.    General health as reported by patient is good.  Pertinent medical history includes:  Osteoarthritis, history of fractures, cancer, overweight, high blood pressure and sleep disorder/apnea.  Medical allergies: yes (adhesive).  Other surgeries include:  Cancer surgery and orthopedic surgery (L ankle ,  ovarian stage 3  and ).  Current medications:  High blood pressure medication, anti-depressants, pain medication, cardiac medication and other (ursodial for liver).  Current occupation is retired.      Barriers include:  None as reported by the patient.  Red flags:  None as reported by the patient.    Objective:  Standing Alignment:    Cervical/Thoracic:  Forward head  Shoulder/UE:  Rounded shoulders  Lumbar:  Normal  Gait:    Gait Type:  Antalgic   Assistive Devices:  None    Knee Evaluation:  ROM:  Strength wnl knee: Mild extensor lag with supine SLR on R.  AROM  Extension:  Left: lacking 4 -/+    Right:  Lacking 7 +  Flexion: Left: WFL    Right: 130 -        PATIENTS NAME:  Nohemy Gurrola   : 1945    PROM  Extension: Left:   Right:  Lacking 6 +  Strength:   Quad Set Left:  WNL    Pain: -   Quad Set Right:  WNL    Pain: -  Edema:    Circumference:  Joint Line:  Right:  41.7 cm    Assessment/Plan:    Patient is a 71 year old female with right side knee complaints.    Patient has the following significant findings with corresponding treatment plan.                Diagnosis 1:  R TKA  Pain -  hot/cold therapy, manual therapy, self management, education and home program  Decreased ROM/flexibility - manual therapy, therapeutic exercise and home program  Decreased strength - therapeutic exercise, therapeutic activities and home program  Decreased proprioception - neuro re-education, gait training, therapeutic activities and home program  Edema - cold therapy and self management/home program  Impaired gait - gait training, assistive devices and home program  Impaired muscle performance - neuro re-education and home program  Decreased function - therapeutic activities and home program    Therapy Evaluation Codes:   1) History comprised of:   Personal factors that impact the plan of care:      None.    Comorbidity factors that impact the plan of care are:      Cancer, High blood pressure, Osteoarthritis and  Overweight.     Medications impacting care: Anti-depressant, High blood pressure and Pain.  2) Examination of Body Systems comprised of:   Body structures and functions that impact the plan of care:      Knee.   Activity limitations that impact the plan of care are:      Bending, Squatting/kneeling, Stairs and Walking.  3) Clinical presentation characteristics are:   Stable/Uncomplicated.  4) Decision-Making    Low complexity using standardized patient assessment instrument and/or measureable assessment of functional outcome.                        PATIENTS NAME:  Nohemy Gurrola   : 1945    Cumulative Therapy Evaluation is: Low complexity.  Previous and current functional limitations:  (See Goal Flow Sheet for this information)    Short term and Long term goals: (See Goal Flow Sheet for this information)   Communication ability:  Patient appears to be able to clearly communicate and understand verbal and written communication and follow directions correctly.  Treatment Explanation - The following has been discussed with the patient:   RX ordered/plan of care  Anticipated outcomes  Possible risks and side effects  This patient would benefit from PT intervention to resume normal activities.   Rehab potential is good.  Frequency:  1 X week, once daily  Duration:  for 8 weeks  Discharge Plan:  Achieve all LTG.  Independent in home treatment program.  Reach maximal therapeutic benefit.                Caregiver Signature/Credentials _____________________________ Date ________      Treating Provider: Wilbert Sullivan DPT, ATC   I have reviewed and certified the need for these services and plan of treatment while under my care.              PHYSICIAN'S SIGNATURE:   ____________________________________  Date___________                                  Nikki Christensen     Certification period:  Beginning of Cert date period: 06/15/17 to  End of Cert period date: 17     Functional Level Progress Report: Please see  "attached \"Goal Flow sheet for Functional level.\"    ____X____ Continue Services or       ________ DC Services                Service dates: From  SOC Date: 06/15/17 date to present                         "

## 2017-06-15 NOTE — MR AVS SNAPSHOT
After Visit Summary   6/15/2017    Nohemy Gurrola    MRN: [id number]           Patient Information     Date Of Birth          1945        Visit Information        Provider Department      6/15/2017 2:40 PM Wilbert Sullivan, PT [facility]        Today's Diagnoses     Aftercare following right knee joint replacement surgery    -  1       Follow-ups after your visit        Your next 10 appointments already scheduled     Jun 21, 2017  3:20 PM CDT   MARLINE Extremity with Wilbert Sullivan PT   [facility] Physical Therapy ([facility]  )    [address] [place] [id number]   [phone]            Jun 28, 2017 11:30 AM CDT   MARLINE Extremity with Wilbert Sullivan PT   [facility] Therapy ([facility]  )    [address] [place] [id number]   [phone]              Who to contact     If you have questions or need follow up information about today's clinic visit or your schedule please contact [facility] directly at [phone].  Normal or non-critical lab and imaging results will be communicated to you by [employer], letter or phone within 4 business days after the clinic has received the results. If you do not hear from us within 7 days, please contact the clinic through [employer] or phone. If you have a critical or abnormal lab result, we will notify you by phone as soon as possible.  Submit refill requests through [employer] or call your pharmacy and they will forward the refill request to us. Please allow 3 business days for your refill to be completed.          Additional Information About Your Visit        MyChart Information     [employer] gives you secure access to your electronic health record. If you see a primary care provider, you can also send messages to your care team and make  appointments. If you have questions, please call your primary care clinic.  If you do not have a primary care provider, please call [phone] and they will assist you.        Care EveryWhere ID     This is your Care EveryWhere ID. This could be used by other organizations to access your [facility] medical records  [id number]         Blood Pressure from Last 3 Encounters:   03/30/17 155/80   03/16/17 118/76   09/03/16 116/74    Weight from Last 3 Encounters:   03/30/17 83.8 kg (184 lb 12.8 oz)   03/16/17 85.3 kg (188 lb)   09/03/16 89.8 kg (198 lb)              We Performed the Following     MARLINE CERT REPORT     MARLINE Inital Eval Report     PT Eval, Low Complexity (89652)     Therapeutic Exercises        Primary Care Provider    Provider Outside       No address on file        Thank you!     Thank you for choosing [facility]  for your care. Our goal is always to provide you with excellent care. Hearing back from our patients is one way we can continue to improve our services. Please take a few minutes to complete the written survey that you may receive in the mail after your visit with us. Thank you!             Your Updated Medication List - Protect others around you: Learn how to safely use, store and throw away your medicines at www.disposemymeds.org.          This list is accurate as of: 6/15/17 11:59 PM.  Always use your most recent med list.                   Brand Name Dispense Instructions for use    AMBIEN PO      Take 12 mg by mouth At Bedtime       ASPIRIN PO      Take 81 mg by mouth daily       ATORVASTATIN CALCIUM PO      Take 10 mg by mouth       EFFEXOR PO      Take 150 mg by mouth daily       LOSARTAN POTASSIUM PO      Take 100 mg by mouth       multivitamin, therapeutic Tabs tablet      Take 1 tablet by mouth daily Reported on 3/30/2017       omeprazole 20 MG CR capsule    priLOSEC     Take 20 mg by mouth daily       polyethylene glycol powder     MIRALAX/GLYCOLAX     TK 17 GRAMS DISSOLVED IN 6 OUNCES WATER ONCE D PRF CONSTIPATION       URSODIOL PO      Take by mouth 2 times daily       VITAMIN D3 PO      Take by mouth daily

## 2017-06-15 NOTE — PROGRESS NOTES
Subjective:    Patient is a 71 year old female presenting with rehab right knee hpi.   Nohemy Gurrola is a 71 year old female with a right knee condition.  Condition occurred with:  Degenerative joint disease.  Condition occurred: for unknown reasons.  This is a chronic condition  5/2/17.  Patient reports multiple year history of B knee pain.  She underwent R TKA surgery on 5/2/17.  She was discharged from hospital to TCU after 3 nights.  She was discharged from TCU to home after 10 days.  She lives in apartment with elevator.  She intermittently uses cane for community ambulation..    Patient reports pain:  Lateral.  Radiates to:  Thigh.  Pain is described as other (tight) and is intermittent and reported as 1/10.  Associated symptoms:  Loss of motion/stiffness and edema. Pain is worse during the night.  Symptoms are exacerbated by walking, descending stairs, ascending stairs and bending/squatting and relieved by rest.  Since onset symptoms are gradually improving.  Special tests:  X-ray.  Previous treatment includes surgery and physical therapy.    General health as reported by patient is good.  Pertinent medical history includes:  Osteoarthritis, history of fractures, cancer, overweight, high blood pressure and sleep disorder/apnea.  Medical allergies: yes (adhesive).  Other surgeries include:  Cancer surgery and orthopedic surgery (L ankle 1980, ovarian stage 3 2004 and 2009).  Current medications:  High blood pressure medication, anti-depressants, pain medication, cardiac medication and other (ursodial for liver).  Current occupation is retired.        Barriers include:  None as reported by the patient.    Red flags:  None as reported by the patient.                        Objective:    Standing Alignment:    Cervical/Thoracic:  Forward head  Shoulder/UE:  Rounded shoulders  Lumbar:  Normal            Gait:    Gait Type:  Antalgic   Assistive Devices:  None                                                         Knee Evaluation:  ROM:  Strength wnl knee: Mild extensor lag with supine SLR on R.  AROM      Extension:  Left: lacking 4 -/+    Right:  Lacking 7 +  Flexion: Left: WFL    Right: 130 -  PROM      Extension: Left:   Right:  Lacking 6 +        Strength:         Quad Set Left:  WNL    Pain: -   Quad Set Right:  WNL    Pain: -        Edema:    Circumference:      Joint Line:  Right:  41.7 cm            General     ROS    Assessment/Plan:      Patient is a 71 year old female with right side knee complaints.    Patient has the following significant findings with corresponding treatment plan.                Diagnosis 1:  R TKA  Pain -  hot/cold therapy, manual therapy, self management, education and home program  Decreased ROM/flexibility - manual therapy, therapeutic exercise and home program  Decreased strength - therapeutic exercise, therapeutic activities and home program  Decreased proprioception - neuro re-education, gait training, therapeutic activities and home program  Edema - cold therapy and self management/home program  Impaired gait - gait training, assistive devices and home program  Impaired muscle performance - neuro re-education and home program  Decreased function - therapeutic activities and home program    Therapy Evaluation Codes:   1) History comprised of:   Personal factors that impact the plan of care:      None.    Comorbidity factors that impact the plan of care are:      Cancer, High blood pressure, Osteoarthritis and Overweight.     Medications impacting care: Anti-depressant, High blood pressure and Pain.  2) Examination of Body Systems comprised of:   Body structures and functions that impact the plan of care:      Knee.   Activity limitations that impact the plan of care are:      Bending, Squatting/kneeling, Stairs and Walking.  3) Clinical presentation characteristics are:   Stable/Uncomplicated.  4) Decision-Making    Low complexity using standardized patient assessment instrument and/or  measureable assessment of functional outcome.  Cumulative Therapy Evaluation is: Low complexity.    Previous and current functional limitations:  (See Goal Flow Sheet for this information)    Short term and Long term goals: (See Goal Flow Sheet for this information)     Communication ability:  Patient appears to be able to clearly communicate and understand verbal and written communication and follow directions correctly.  Treatment Explanation - The following has been discussed with the patient:   RX ordered/plan of care  Anticipated outcomes  Possible risks and side effects  This patient would benefit from PT intervention to resume normal activities.   Rehab potential is good.    Frequency:  1 X week, once daily  Duration:  for 8 weeks  Discharge Plan:  Achieve all LTG.  Independent in home treatment program.  Reach maximal therapeutic benefit.    Please refer to the daily flowsheet for treatment today, total treatment time and time spent performing 1:1 timed codes.

## 2017-06-19 PROBLEM — Z96.651 AFTERCARE FOLLOWING RIGHT KNEE JOINT REPLACEMENT SURGERY: Status: ACTIVE | Noted: 2017-06-19

## 2017-06-19 PROBLEM — Z47.1 AFTERCARE FOLLOWING RIGHT KNEE JOINT REPLACEMENT SURGERY: Status: ACTIVE | Noted: 2017-06-19

## 2017-06-21 ENCOUNTER — THERAPY VISIT (OUTPATIENT)
Dept: PHYSICAL THERAPY | Facility: CLINIC | Age: 72
End: 2017-06-21
Payer: MEDICARE

## 2017-06-21 DIAGNOSIS — Z96.651 AFTERCARE FOLLOWING RIGHT KNEE JOINT REPLACEMENT SURGERY: ICD-10-CM

## 2017-06-21 DIAGNOSIS — Z47.1 AFTERCARE FOLLOWING RIGHT KNEE JOINT REPLACEMENT SURGERY: ICD-10-CM

## 2017-06-21 PROCEDURE — 97140 MANUAL THERAPY 1/> REGIONS: CPT | Mod: GP | Performed by: PHYSICAL THERAPIST

## 2017-06-21 PROCEDURE — 97110 THERAPEUTIC EXERCISES: CPT | Mod: GP | Performed by: PHYSICAL THERAPIST

## 2017-06-28 ENCOUNTER — THERAPY VISIT (OUTPATIENT)
Dept: PHYSICAL THERAPY | Facility: CLINIC | Age: 72
End: 2017-06-28
Payer: MEDICARE

## 2017-06-28 DIAGNOSIS — Z96.651 AFTERCARE FOLLOWING RIGHT KNEE JOINT REPLACEMENT SURGERY: ICD-10-CM

## 2017-06-28 DIAGNOSIS — Z47.1 AFTERCARE FOLLOWING RIGHT KNEE JOINT REPLACEMENT SURGERY: ICD-10-CM

## 2017-06-28 PROCEDURE — 97110 THERAPEUTIC EXERCISES: CPT | Mod: GP | Performed by: PHYSICAL THERAPIST

## 2017-06-28 PROCEDURE — 97140 MANUAL THERAPY 1/> REGIONS: CPT | Mod: GP | Performed by: PHYSICAL THERAPIST

## 2017-06-28 PROCEDURE — 97112 NEUROMUSCULAR REEDUCATION: CPT | Mod: GP | Performed by: PHYSICAL THERAPIST

## 2017-07-05 ENCOUNTER — THERAPY VISIT (OUTPATIENT)
Dept: PHYSICAL THERAPY | Facility: CLINIC | Age: 72
End: 2017-07-05
Payer: MEDICARE

## 2017-07-05 DIAGNOSIS — Z96.651 AFTERCARE FOLLOWING RIGHT KNEE JOINT REPLACEMENT SURGERY: ICD-10-CM

## 2017-07-05 DIAGNOSIS — Z47.1 AFTERCARE FOLLOWING RIGHT KNEE JOINT REPLACEMENT SURGERY: ICD-10-CM

## 2017-07-05 PROCEDURE — 97110 THERAPEUTIC EXERCISES: CPT | Mod: GP | Performed by: PHYSICAL THERAPIST

## 2017-07-05 PROCEDURE — 97140 MANUAL THERAPY 1/> REGIONS: CPT | Mod: GP | Performed by: PHYSICAL THERAPIST

## 2017-07-05 PROCEDURE — 97112 NEUROMUSCULAR REEDUCATION: CPT | Mod: GP | Performed by: PHYSICAL THERAPIST

## 2017-07-12 ENCOUNTER — THERAPY VISIT (OUTPATIENT)
Dept: PHYSICAL THERAPY | Facility: CLINIC | Age: 72
End: 2017-07-12
Payer: MEDICARE

## 2017-07-12 DIAGNOSIS — Z47.1 AFTERCARE FOLLOWING RIGHT KNEE JOINT REPLACEMENT SURGERY: ICD-10-CM

## 2017-07-12 DIAGNOSIS — Z96.651 AFTERCARE FOLLOWING RIGHT KNEE JOINT REPLACEMENT SURGERY: ICD-10-CM

## 2017-07-12 PROCEDURE — 97110 THERAPEUTIC EXERCISES: CPT | Mod: GP | Performed by: PHYSICAL THERAPIST

## 2017-07-12 PROCEDURE — 97140 MANUAL THERAPY 1/> REGIONS: CPT | Mod: GP | Performed by: PHYSICAL THERAPIST

## 2017-07-12 PROCEDURE — 97112 NEUROMUSCULAR REEDUCATION: CPT | Mod: GP | Performed by: PHYSICAL THERAPIST

## 2017-07-19 ENCOUNTER — THERAPY VISIT (OUTPATIENT)
Dept: PHYSICAL THERAPY | Facility: CLINIC | Age: 72
End: 2017-07-19
Payer: MEDICARE

## 2017-07-19 DIAGNOSIS — Z47.1 AFTERCARE FOLLOWING RIGHT KNEE JOINT REPLACEMENT SURGERY: ICD-10-CM

## 2017-07-19 DIAGNOSIS — Z96.651 AFTERCARE FOLLOWING RIGHT KNEE JOINT REPLACEMENT SURGERY: ICD-10-CM

## 2017-07-19 PROCEDURE — 97112 NEUROMUSCULAR REEDUCATION: CPT | Mod: GP | Performed by: PHYSICAL THERAPIST

## 2017-07-19 PROCEDURE — 97140 MANUAL THERAPY 1/> REGIONS: CPT | Mod: GP | Performed by: PHYSICAL THERAPIST

## 2017-07-19 PROCEDURE — 97110 THERAPEUTIC EXERCISES: CPT | Mod: GP | Performed by: PHYSICAL THERAPIST

## 2017-07-26 ENCOUNTER — THERAPY VISIT (OUTPATIENT)
Dept: PHYSICAL THERAPY | Facility: CLINIC | Age: 72
End: 2017-07-26
Payer: MEDICARE

## 2017-07-26 DIAGNOSIS — Z96.651 AFTERCARE FOLLOWING RIGHT KNEE JOINT REPLACEMENT SURGERY: ICD-10-CM

## 2017-07-26 DIAGNOSIS — Z47.1 AFTERCARE FOLLOWING RIGHT KNEE JOINT REPLACEMENT SURGERY: ICD-10-CM

## 2017-07-26 PROCEDURE — G8979 MOBILITY GOAL STATUS: HCPCS | Mod: GP | Performed by: PHYSICAL THERAPIST

## 2017-07-26 PROCEDURE — 97140 MANUAL THERAPY 1/> REGIONS: CPT | Mod: GP | Performed by: PHYSICAL THERAPIST

## 2017-07-26 PROCEDURE — G8980 MOBILITY D/C STATUS: HCPCS | Mod: GP | Performed by: PHYSICAL THERAPIST

## 2017-07-26 PROCEDURE — 97110 THERAPEUTIC EXERCISES: CPT | Mod: GP | Performed by: PHYSICAL THERAPIST

## 2017-07-26 ASSESSMENT — ACTIVITIES OF DAILY LIVING (ADL)
SIT WITH YOUR KNEE BENT: ACTIVITY IS NOT DIFFICULT
WEAKNESS: I DO NOT HAVE THE SYMPTOM
STIFFNESS: I HAVE THE SYMPTOM BUT IT DOES NOT AFFECT MY ACTIVITY
KNEE_ACTIVITY_OF_DAILY_LIVING_SCORE: 85.71
GIVING WAY, BUCKLING OR SHIFTING OF KNEE: I DO NOT HAVE THE SYMPTOM
RAW_SCORE: 60
STAND: ACTIVITY IS MINIMALLY DIFFICULT
LIMPING: I DO NOT HAVE THE SYMPTOM
SQUAT: ACTIVITY IS NOT DIFFICULT
HOW_WOULD_YOU_RATE_THE_OVERALL_FUNCTION_OF_YOUR_KNEE_DURING_YOUR_USUAL_DAILY_ACTIVITIES?: NORMAL
GO DOWN STAIRS: ACTIVITY IS MINIMALLY DIFFICULT
RISE FROM A CHAIR: ACTIVITY IS MINIMALLY DIFFICULT
PAIN: I DO NOT HAVE THE SYMPTOM
KNEEL ON THE FRONT OF YOUR KNEE: I AM UNABLE TO DO THE ACTIVITY
HOW_WOULD_YOU_RATE_THE_CURRENT_FUNCTION_OF_YOUR_KNEE_DURING_YOUR_USUAL_DAILY_ACTIVITIES_ON_A_SCALE_FROM_0_TO_100_WITH_100_BEING_YOUR_LEVEL_OF_KNEE_FUNCTION_PRIOR_TO_YOUR_INJURY_AND_0_BEING_THE_INABILITY_TO_PERFORM_ANY_OF_YOUR_USUAL_DAILY_ACTIVITIES?: 95
GO UP STAIRS: ACTIVITY IS NOT DIFFICULT
KNEE_ACTIVITY_OF_DAILY_LIVING_SUM: 60
WALK: ACTIVITY IS NOT DIFFICULT
SWELLING: I HAVE THE SYMPTOM BUT IT DOES NOT AFFECT MY ACTIVITY
AS_A_RESULT_OF_YOUR_KNEE_INJURY,_HOW_WOULD_YOU_RATE_YOUR_CURRENT_LEVEL_OF_DAILY_ACTIVITY?: NORMAL

## 2017-07-26 NOTE — PROGRESS NOTES
Subjective:    HPI                    Objective:      Gait:    Gait Type:  Antalgic   Assistive Devices:  None  Deviations:  Knee:  Knee extension decr LAnkle:  Push off decr L                                                      Knee Evaluation:  ROM:    AROM      Extension:  Left: lacking 6    Right:  0 -  Flexion: Left: limited    Right: 126 -        Strength:         Quad Set Left:  Good    Pain: +/-   Quad Set Right:  Good    Pain: -        Edema:  Edema of the knee: Mild-moderate edema B knees.            General     ROS    Assessment/Plan:      DISCHARGE REPORT    Progress reporting period is from 6/15/17 to 7/26/17.       SUBJECTIVE  Subjective changes noted by patient:  Patient reports her R knee feels better than it has in years. She is able to walk and perform stairs with greater ease. She is performing PT prescribed HEP consistently. Patient notes that her L knee is painful and limits her activity. She plans to undergo L TKA surgery later this year..       Current Pain level: 0/10.     Initial Pain level: 1/10.   Changes in function:  Yes (See Goal flowsheet attached for changes in current functional level)  Adverse reaction to treatment or activity: None    OBJECTIVE  Changes noted in objective findings:  Yes, see objective findings.  Objective: R knee AROM: 0-126 -.     ASSESSMENT/PLAN  Updated problem list and treatment plan: Diagnosis 1:  R TKA  Pain -  hot/cold therapy, manual therapy, self management, education and home program  Decreased ROM/flexibility - manual therapy, therapeutic exercise and home program  Decreased strength - therapeutic exercise, therapeutic activities and home program  Decreased proprioception - neuro re-education, gait training, therapeutic activities and home program  Edema - cold therapy and self management/home program  Impaired gait - gait training, assistive devices and home program  Impaired muscle performance - neuro re-education and home program  Decreased function  - therapeutic activities and home program  STG/LTGs have been met or progress has been made towards goals:  Yes (See Goal flow sheet completed today.)  Assessment of Progress: The patient's condition is improving.  Self Management Plans:  Patient is independent in a home treatment program.  Patient is independent in self management of symptoms.  I have re-evaluated this patient and find that the nature, scope, duration and intensity of the therapy is appropriate for the medical condition of the patient.  Nohemy continues to require the following intervention to meet STG and LTG's:  PT intervention is no longer required to meet STG/LTG.    Recommendations:  This patient is ready to be discharged from therapy and continue their home treatment program.    Please refer to the daily flowsheet for treatment today, total treatment time and time spent performing 1:1 timed codes.

## 2017-07-26 NOTE — MR AVS SNAPSHOT
After Visit Summary   7/26/2017    Nohemy Gurrola    MRN: [id number]           Patient Information     Date Of Birth          1945        Visit Information        Provider Department      7/26/2017 11:30 AM Wilbert Sullivan PT [facility] Physical [facility]        Today's Diagnoses     Aftercare following right knee joint replacement surgery           Follow-ups after your visit        Who to contact     If you have questions or need follow up information about today's clinic visit or your schedule please contact [facility]THLETIC [facility] PHYSICAL [facility] directly at [phone].  Normal or non-critical lab and imaging results will be communicated to you by MOOVIAhart, letter or phone within 4 business days after the clinic has received the results. If you do not hear from us within 7 days, please contact the clinic through [employer] or phone. If you have a critical or abnormal lab result, we will notify you by phone as soon as possible.  Submit refill requests through [employer] or call your pharmacy and they will forward the refill request to us. Please allow 3 business days for your refill to be completed.          Additional Information About Your Visit        MyChart Information     [employer] gives you secure access to your electronic health record. If you see a primary care provider, you can also send messages to your care team and make appointments. If you have questions, please call your primary care clinic.  If you do not have a primary care provider, please call [phone] and they will assist you.        Care EveryWhere ID     This is your Care EveryWhere ID. This could be used by other organizations to access your [place] medical records  [id number]         Blood Pressure from Last 3 Encounters:   03/30/17 155/80   03/16/17 118/76   09/03/16 116/74    Weight from Last 3 Encounters:   03/30/17 83.8 kg (184 lb 12.8 oz)   03/16/17 85.3  kg (188 lb)   09/03/16 89.8 kg (198 lb)              We Performed the Following     MARLINE Progress Notes Report     Manual Ther Tech, 1+Regions, EA 15 min     Therapeutic Exercises        Primary Care Provider    Provider Outside       No address on file        Equal Access to Services     SHANIA ANALILIA : Dior mehul nino kellyo Soalan, waaxda luqadaha, qaybta kaalmada adeegyada, jairo gordon laMarceloherbert cunningham. So [facility] [phone].    ATENCIÓN: Si habla español, tiene a moreno disposición servicios gratuitos de asistencia lingüística. Llame al [phone].    We comply with applicable federal civil rights laws and [place] laws. We do not discriminate on the basis of race, color, national origin, age, disability sex, sexual orientation or gender identity.            Thank you!     Thank you for choosing [facility] PHYSICAL THERAP[facility]  for your care. Our goal is always to provide you with excellent care. Hearing back from our patients is one way we can continue to improve our services. Please take a few minutes to complete the written survey that you may receive in the mail after your visit with us. Thank you!             Your Updated Medication List - Protect others around you: Learn how to safely use, store and throw away your medicines at www.disposemymeds.org.          This list is accurate as of: 7/26/17  3:57 PM.  Always use your most recent med list.                   Brand Name Dispense Instructions for use Diagnosis    AMBIEN PO      Take 12 mg by mouth At Bedtime        ASPIRIN PO      Take 81 mg by mouth daily        ATORVASTATIN CALCIUM PO      Take 10 mg by mouth        EFFEXOR PO      Take 150 mg by mouth daily        LOSARTAN POTASSIUM PO      Take 100 mg by mouth        multivitamin, therapeutic Tabs tablet      Take 1 tablet by mouth daily Reported on 3/30/2017        omeprazole 20 MG CR capsule    priLOSEC     Take 20 mg by mouth daily        polyethylene  glycol powder    MIRALAX/GLYCOLAX     TK 17 GRAMS DISSOLVED IN 6 OUNCES WATER ONCE D PRF CONSTIPATION        URSODIOL PO      Take by mouth 2 times daily        VITAMIN D3 PO      Take by mouth daily

## 2017-07-26 NOTE — LETTER
[facility] ATHLETIC [facility] [address] PHYSICAL THERAPY  [address] [address]  [phone]    2017    Re: Nohemy Gurrola   :   1945  MRN:  [id number]   REFERRING PHYSICIAN:   Nikki Shaffer    [facility] ATHLETIC [facility] [address] PHYSICAL [facility]    Date of Initial Evaluation:  06/15/2017  Visits:  7  Rxs Used: 7  Reason for Referral:  Aftercare following right knee joint replacement surgery    DISCHARGE REPORT    Progress reporting period is from 6/15/17 to 17.       SUBJECTIVE  Subjective changes noted by patient:  Patient reports her R knee feels better than it has in years. She is able to walk and perform stairs with greater ease. She is performing PT prescribed HEP consistently. Patient notes that her L knee is painful and limits her activity. She plans to undergo L TKA surgery later this year..       Current Pain level: 0/10.     Initial Pain level: 1/10.   Changes in function:  Yes (See Goal flowsheet attached for changes in current functional level)  Adverse reaction to treatment or activity: None    OBJECTIVE  Changes noted in objective findings:  Yes, see objective findings.  Objective: R knee AROM: 0-126 -.     ASSESSMENT/PLAN  Updated problem list and treatment plan: Diagnosis 1:  R TKA  Pain -  hot/cold therapy, manual therapy, self management, education and home program  Decreased ROM/flexibility - manual therapy, therapeutic exercise and home program  Decreased strength - therapeutic exercise, therapeutic activities and home program  Decreased proprioception - neuro re-education, gait training, therapeutic activities and home program  Edema - cold therapy and self management/home program  Impaired gait - gait training, assistive devices and home program  Impaired muscle performance - neuro re-education and home program  Decreased function - therapeutic activities and home program  STG/LTGs have been met or progress has been made towards  goals:  Yes (See Goal flow sheet completed today.)  Assessment of Progress: The patient's condition is improving.  Self Management Plans:  Patient is independent in a home treatment program.  Patient is independent in self management of symptoms.  Re: Nohemy Gurrola   :   1945    I have re-evaluated this patient and find that the nature, scope, duration and intensity of the therapy is appropriate for the medical condition of the patient.  Nohemy continues to require the following intervention to meet STG and LTG's:  PT intervention is no longer required to meet STG/LTG.    Recommendations:  This patient is ready to be discharged from therapy and continue their home treatment program.                Thank you for your referral.    INQUIRIES  Therapist: Wilbert Sullivan, PT  [facility]  [address] [place] [id number]  Phone: [phone]  Fax: [phone]

## 2017-12-04 ENCOUNTER — OFFICE VISIT - HEALTHEAST (OUTPATIENT)
Dept: GERIATRICS | Facility: CLINIC | Age: 72
End: 2017-12-04

## 2017-12-04 DIAGNOSIS — F32.A DEPRESSION: ICD-10-CM

## 2017-12-04 DIAGNOSIS — Z96.652 S/P TOTAL KNEE ARTHROPLASTY, LEFT: ICD-10-CM

## 2017-12-04 DIAGNOSIS — K74.3 PRIMARY BILIARY CHOLANGITIS (H): ICD-10-CM

## 2017-12-04 DIAGNOSIS — K21.9 GERD (GASTROESOPHAGEAL REFLUX DISEASE): ICD-10-CM

## 2017-12-04 DIAGNOSIS — M17.9 OA (OSTEOARTHRITIS) OF KNEE: ICD-10-CM

## 2017-12-04 DIAGNOSIS — Z85.43 H/O OVARIAN CANCER: ICD-10-CM

## 2017-12-04 DIAGNOSIS — G89.18 POST-OP PAIN: ICD-10-CM

## 2017-12-04 DIAGNOSIS — I10 ESSENTIAL HYPERTENSION: ICD-10-CM

## 2017-12-05 ENCOUNTER — AMBULATORY - HEALTHEAST (OUTPATIENT)
Dept: ADMINISTRATIVE | Facility: CLINIC | Age: 72
End: 2017-12-05

## 2017-12-08 ENCOUNTER — OFFICE VISIT - HEALTHEAST (OUTPATIENT)
Dept: GERIATRICS | Facility: CLINIC | Age: 72
End: 2017-12-08

## 2017-12-08 DIAGNOSIS — K74.3 PRIMARY BILIARY CHOLANGITIS (H): ICD-10-CM

## 2017-12-08 DIAGNOSIS — Z96.652 S/P TOTAL KNEE ARTHROPLASTY, LEFT: ICD-10-CM

## 2017-12-08 DIAGNOSIS — K21.9 GERD (GASTROESOPHAGEAL REFLUX DISEASE): ICD-10-CM

## 2017-12-08 DIAGNOSIS — I10 ESSENTIAL HYPERTENSION: ICD-10-CM

## 2017-12-08 DIAGNOSIS — M17.9 OA (OSTEOARTHRITIS) OF KNEE: ICD-10-CM

## 2017-12-12 ENCOUNTER — AMBULATORY - HEALTHEAST (OUTPATIENT)
Dept: GERIATRICS | Facility: CLINIC | Age: 72
End: 2017-12-12

## 2018-05-14 DIAGNOSIS — C56.9 OVARIAN CANCER (H): Primary | ICD-10-CM

## 2018-05-23 NOTE — PROGRESS NOTES
Consult Notes on Referred Patient    Date: 05/24/2018     Nohemy Gurrola is a very pleasant 72 year old woman with a history of recurrent stage IIIc ovarian cancer.      Brief Oncology History:    Nohemy is a 71 year old woman who just moved back to [place] from [place], [place]. She is originally from [place] and moved back to be closer to friends and family. She had an open TLH-BSO in 1985 due to endometriosis and was on premarin for 19 years until 2002 when she was taken off. She was originally diagnosed with stage IIIc ovarian cancer in 2004 - timeline below. She takes a baby aspirin daily. No fx of ovarian or breast cancer. No hx of diabetes, heart or lung disease. She is an only child and does not have any children. She continues to take miralax for radiation proctitis.     2004 - dianosed with stage IIIc ovarian cancer  - presented with blood clot, CT scan revealed right sided pelvic mass measuring 7 x 6 cm with obstruction of ureter.   - CEA = 106  - November: removal of pelvic mass, 2/5 lymph nodes revealed metastatic adenocarcinoma, no lymphvascular space invasion   - treated surgically and with chemo (carbo/taxol) with Dr. More in [place].   -initial  = 1348    [id number]: disease free    January 2009:  increased to 18.2    June 2009: interval increase in size of mass. Dr. More was worried that disease had recurred.     July 2009: diagnostic laparoscopy revealed extensive adhesions, underwent debulking and additional carbo/taxol plus radiation to pelvis     7/16/09: MRI pelvis  Impression:  1. Right sided 3.3 cm pelvic mass worrisome for recurrent disease  2. Solitary prominent 13mm right common iliac artery lymph node  7/24/09: right pelvic sidewall mass - biopsy revealed adipose tissue with metastatic adenocarcinoma consistent with given clinical history of patient's carcinoma of ovary.     4/11/17: pathology consult   FINAL DIAGNOSIS:   CASE FROM [facility]  "[place], [place] ([phone],   NEED TO VERIFY DATE OBTAINED):     SOFT TISSUE, RIGHT PELVIC SIDEWALL MASS, BIOPSY:   - Adenocarcinoma (see comment)   - One reactive lymph node negative for carcinoma (0/1)   COMMENT:   Tissue sections show adenocarcinoma that is morphologically compatible   with endometrioid morphology.  The adenocarcinoma involves   mesothelial-lined fibroadipose tissue and a portion of an uninvolved   lymph node is also present.  No lymph-vascular invasion is identified.     Summer 2016: had severe UTI and e. Coli infection. CT scan done in [place] which revealed \"small spot\" in pelvis, not representative of recurrent disease.  was 10.6 at this time    December 2016: Moved back to [place]    [facility]17/[facility] which is higher than usual for her    3/7/2017: CT CAP  Impression:  1. No evidence of residual/recurrent disease in chest, abdomen and pelvis.   2. 1.3 cm nodule left lobe of thyroid which may be evaluated by US thyroid if not done in the past.       Today: Patient is doing well with no complaints. She is inquiring about follow-up plans from now on since she has not had any recurrences since.          Date Value Ref Range Status   05/24/2018 12 0 - 30 U/mL Final     Comment:     Assay Method:  Chemiluminescence using Siemens Centaur XP   03/30/2017 16 0 - 30 U/mL Final     Comment:     Assay Method:  Chemiluminescence using Siemens Centaur XP       ROS:    ROS: 10 point ROS neg other than the symptoms noted above in the HPI.    I have reviewed and addressed the patient's review of symptoms for today's visit.     Past Medical History:    Past Medical History:   Diagnosis Date     DVT (deep vein thrombosis) in pregnancy (H) 2004    left saphenous vein     Endometriosis      Heartburn      Hemorrhoids      Hyperlipidemia      Hypertension      Insomnia      Major depression      Ovarian cancer (H) 11/2004 and recurred 7/2009     Primary biliary cholangitis (H)  "         Past Surgical History:    Past Surgical History:   Procedure Laterality Date     AS TOTAL ABDOM HYSTERECTOMY  1985    for endometriosis and fibroids     CHOLECYSTECTOMY  2004     LAPAROSCOPY  2009    for recurrent ovarian cancer     LAPAROTOMY EXPLORATORY  2004    for ovarian cancer     OPEN REDUCTION INTERNAL FIXATION ANKLE Left 1980     SALPINGO-OOPHORECTOMY BILATERAL Bilateral 1985         Health Maintenance:  Health Maintenance Due   Topic Date Due     TETANUS IMMUNIZATION (SYSTEM ASSIGNED)  12/29/1963     HEPATITIS C SCREENING  12/29/1963     LIPID SCREEN Q5 YR FEMALE (SYSTEM ASSIGNED)  12/29/1990     ADVANCE DIRECTIVE PLANNING Q5 YRS  12/29/2000     DEXA SCAN SCREENING (SYSTEM ASSIGNED)  12/29/2010     PNEUMOCOCCAL (1 of 2 - PCV13) 12/29/2010     MAMMO SCREEN Q2 YR (SYSTEM ASSIGNED)  01/01/2016     FALL RISK ASSESSMENT  03/16/2018       Last Pap Smear:     Last Mammogram: 10/2016              Result: normal      She has not had a history of abnormal mammograms.    Last Colonoscopy: Within last 5 years           Result: normal                        Current Medications:     has a current medication list which includes the following prescription(s): aspirin, atorvastatin calcium, cholecalciferol, losartan potassium, multivitamin, therapeutic, omeprazole, polyethylene glycol, ursodiol, venlafaxine hcl, and zolpidem tartrate.       Allergies:        Allergies   Allergen Reactions     Ciprofloxacin Other (See Comments)     Pain in legs and feet     Adhesive Tape      Perfume            Social History:     Social History   Substance Use Topics     Smoking status: Never Smoker     Smokeless tobacco: Never Used     Alcohol use No       History   Drug Use No           Family History:     The patient's family history is notable for none.    Family History   Problem Relation Age of Onset     Bipolar Disorder Mother      Suicide Mother          Physical Exam:     /67  Pulse 77  Temp 98.6  F (37  C)  Resp  "16  Ht 1.664 m (5' 5.5\")  Wt 78.7 kg (173 lb 9.6 oz)  SpO2 100%  BMI 28.45 kg/m2  Body mass index is 28.45 kg/(m^2).    General Appearance: healthy and alert, no distress     HEENT:  no thyromegaly, no palpable nodules or masses        Cardiovascular: Well perfused     Respiratory: No increased work of breathing    Musculoskeletal: extremities non-tender and without edema. Bilateral scars over both knees from recent knee replacement     Skin: no lesions or rashes     Neurological: normal gait, no gross defects     Psychiatric: appropriate mood and affect                               Hematological: normal cervical, supraclavicular and inguinal lymph nodes     Gastrointestinal:       abdomen soft, non-tender, non-distended, no organomegaly or masses, RLQ scar from appendectomy, vertical midline scar     Genitourinary: External genitalia and urethral meatus appears normal.  Vagina is atrophic but smooth without nodularity or masses.  Cervix surgically absent.  Bimanual exam reveal no masses, nodularity or fullness.  Recto-vaginal exam confirms these findings.      Assessment:    Nohemy Gurrola is a 72 year old woman with a history of stage IIIc ovarian cancer.       Plan:     1.)    RTC to see me in 1 year for surveillance visit as  pending.      2.) Genetic risk factors were assessed.    3.) Labs and/or tests ordered include:   today.     4.) Health maintenance issues addressed today include none.      Thank you for allowing us to participate in the care of your patient.       I, Afua Tovar MS4, am serving as a scribe to document services personally performed by Evi Boyd MD, based upon my observations and the provider's statements to me. All documentation has been reviewed by the aforementioned doctor prior to being entered into the official medical record.     I have reviewed the above note and agree with the scribe's notation as written.    Of a 30 minute appointment, more than 50% was " spent in counseling the patient.    Evi Boyd MD    Department of Ob/Gyn and Women's Health  Division of Gynecologic Oncology  [facility]  [phone]

## 2018-05-24 ENCOUNTER — ONCOLOGY VISIT (OUTPATIENT)
Dept: ONCOLOGY | Facility: CLINIC | Age: 73
End: 2018-05-24
Attending: OBSTETRICS & GYNECOLOGY
Payer: MEDICARE

## 2018-05-24 ENCOUNTER — APPOINTMENT (OUTPATIENT)
Dept: LAB | Facility: CLINIC | Age: 73
End: 2018-05-24
Attending: OBSTETRICS & GYNECOLOGY
Payer: MEDICARE

## 2018-05-24 VITALS
HEIGHT: 66 IN | DIASTOLIC BLOOD PRESSURE: 67 MMHG | OXYGEN SATURATION: 100 % | WEIGHT: 173.6 LBS | TEMPERATURE: 98.6 F | SYSTOLIC BLOOD PRESSURE: 129 MMHG | HEART RATE: 77 BPM | BODY MASS INDEX: 27.9 KG/M2 | RESPIRATION RATE: 16 BRPM

## 2018-05-24 DIAGNOSIS — C56.9 OVARIAN CANCER (H): ICD-10-CM

## 2018-05-24 LAB — CANCER AG125 SERPL-ACNC: 12 U/ML (ref 0–30)

## 2018-05-24 PROCEDURE — 86304 IMMUNOASSAY TUMOR CA 125: CPT | Performed by: OBSTETRICS & GYNECOLOGY

## 2018-05-24 PROCEDURE — 99214 OFFICE O/P EST MOD 30 MIN: CPT | Mod: ZP | Performed by: OBSTETRICS & GYNECOLOGY

## 2018-05-24 PROCEDURE — 36415 COLL VENOUS BLD VENIPUNCTURE: CPT

## 2018-05-24 PROCEDURE — G0463 HOSPITAL OUTPT CLINIC VISIT: HCPCS | Mod: ZF

## 2018-05-24 ASSESSMENT — PAIN SCALES - GENERAL: PAINLEVEL: NO PAIN (0)

## 2018-05-24 NOTE — MR AVS SNAPSHOT
After Visit Summary   5/24/2018    Nohemy Gurrola    MRN: [id number]           Patient Information     Date Of Birth          1945        Visit Information        Provider Department      5/24/2018 2:20 PM Evi Boyd MD [facility]        Today's Diagnoses     Ovarian cancer (H)           Follow-ups after your visit        Your next 10 appointments already scheduled     May 21, 2019  2:00 PM CDT   (Arrive by 1:45 PM)   Return Visit with TIMBO Goddard CNP   [facility] ([facility])    [address] [place] [address]  [address]  [facility] [address] [phone]              Who to contact     If you have questions or need follow up information about today's clinic visit or your schedule please contact [facility] directly at [phone].  Normal or non-critical lab and imaging results will be communicated to you by MyChart, letter or phone within 4 business days after the clinic has received the results. If you do not hear from us within 7 days, please contact the clinic through [employer] or phone. If you have a critical or abnormal lab result, we will notify you by phone as soon as possible.  Submit refill requests through [employer] or call your pharmacy and they will forward the refill request to us. Please allow 3 business days for your refill to be completed.          Additional Information About Your Visit        MyChart Information     [employer] gives you secure access to your electronic health record. If you see a primary care provider, you can also send messages to your care team and make appointments. If you have questions, please call your primary care clinic.  If you do not have a primary care provider, please call [phone] and they will assist you.        Care EveryWhere ID     This is your Care EveryWhere ID. This could be used by other organizations to access your [facility]  "records  [id number]        Your Vitals Were     Pulse Temperature Respirations Height Pulse Oximetry BMI (Body Mass Index)    77 98.6  F (37  C) 16 1.664 m (5' 5.5\") 100% 28.45 kg/m2       Blood Pressure from Last 3 Encounters:   05/24/18 129/67   03/30/17 155/80   03/16/17 118/76    Weight from Last 3 Encounters:   05/24/18 78.7 kg (173 lb 9.6 oz)   03/30/17 83.8 kg (184 lb 12.8 oz)   03/16/17 85.3 kg (188 lb)              We Performed the Following             Primary Care Provider    Provider Outside       No address on file        Equal Access to Services     SHANIA BILLINGSLEY : Dior Wei, ton mendez, leatha hamilton, jairo patel . So [facility] [phone].    ATENCIÓN: Si habla español, tiene a moreno disposición servicios gratuitos de asistencia lingüística. Llame al [phone].    We comply with applicable federal civil rights laws and [place] laws. We do not discriminate on the basis of race, color, national origin, age, disability, sex, sexual orientation, or gender identity.            Thank you!     Thank you for choosing [facility]  for your care. Our goal is always to provide you with excellent care. Hearing back from our patients is one way we can continue to improve our services. Please take a few minutes to complete the written survey that you may receive in the mail after your visit with us. Thank you!             Your Updated Medication List - Protect others around you: Learn how to safely use, store and throw away your medicines at www.disposemymeds.org.          This list is accurate as of 5/24/18 11:59 PM.  Always use your most recent med list.                   Brand Name Dispense Instructions for use Diagnosis    AMBIEN PO      Take 12 mg by mouth At Bedtime        ASPIRIN PO      Take 81 mg by mouth daily        ATORVASTATIN CALCIUM PO      Take 10 mg by mouth        EFFEXOR PO      Take 150 mg by mouth daily "        LOSARTAN POTASSIUM PO      Take 100 mg by mouth        multivitamin, therapeutic Tabs tablet      Take 1 tablet by mouth daily Reported on 3/30/2017        omeprazole 20 MG CR capsule    priLOSEC     Take 20 mg by mouth daily        polyethylene glycol powder    MIRALAX/GLYCOLAX     TK 17 GRAMS DISSOLVED IN 6 OUNCES WATER ONCE D PRF CONSTIPATION        URSODIOL PO      Take by mouth 2 times daily        VITAMIN D3 PO      Take by mouth daily

## 2018-05-24 NOTE — NURSING NOTE
"Oncology Rooming Note    May 24, 2018 2:39 PM   Nohemy Gurrola is a 72 year old female who presents for:    Chief Complaint   Patient presents with     Labs Only     venipuncture, vitals checked     Oncology Clinic Visit     Return Ovarian Ca     Initial Vitals: /67  Pulse 77  Temp 98.6  F (37  C)  Resp 16  Ht 1.664 m (5' 5.5\")  Wt 78.7 kg (173 lb 9.6 oz)  SpO2 100%  BMI 28.45 kg/m2 Estimated body mass index is 28.45 kg/(m^2) as calculated from the following:    Height as of this encounter: 1.664 m (5' 5.5\").    Weight as of this encounter: 78.7 kg (173 lb 9.6 oz). Body surface area is 1.91 meters squared.  No Pain (0) Comment: Data Unavailable   No LMP recorded. Patient has had a hysterectomy.  Allergies reviewed: Yes  Medications reviewed: Yes    Medications: Medication refills not needed today.  Pharmacy name entered into [employer]: [id number] - [place], [place] - [address] AT [facility] [address]    Clinical concerns:  is high     6 minutes for nursing intake (face to face time)     Huyen Hutchins CMA              "

## 2018-05-24 NOTE — LETTER
5/24/2018       RE: Nohemy Gurrola  [address]  [address]  [place] [address]     Dear Colleague,    Thank you for referring your patient, Nohemy Gurrola, to the [facility]. Please see a copy of my visit note below.                              Consult Notes on Referred Patient    Date: 05/24/2018     Nohemy Gurrola is a very pleasant 72 year old woman with a history of recurrent stage IIIc ovarian cancer.      Brief Oncology History:    Nohemy is a 71 year old woman who just moved back to [place] from [place], [place]. She is originally from [place] and moved back to be closer to friends and family. She had an open TLH-BSO in 1985 due to endometriosis and was on premarin for 19 years until 2002 when she was taken off. She was originally diagnosed with stage IIIc ovarian cancer in 2004 - timeline below. She takes a baby aspirin daily. No fx of ovarian or breast cancer. No hx of diabetes, heart or lung disease. She is an only child and does not have any children. She continues to take miralax for radiation proctitis.     2004 - dianosed with stage IIIc ovarian cancer  - presented with blood clot, CT scan revealed right sided pelvic mass measuring 7 x 6 cm with obstruction of ureter.   - CEA = 106  - November: removal of pelvic mass, 2/5 lymph nodes revealed metastatic adenocarcinoma, no lymphvascular space invasion   - treated surgically and with chemo (carbo/taxol) with Dr. More in [place].   -initial  = 1348    [id number]: disease free    January 2009:  increased to 18.2    June 2009: interval increase in size of mass. Dr. More was worried that disease had recurred.     July 2009: diagnostic laparoscopy revealed extensive adhesions, underwent debulking and additional carbo/taxol plus radiation to pelvis     7/16/09: MRI pelvis  Impression:  1. Right sided 3.3 cm pelvic mass worrisome for recurrent disease  2. Solitary prominent 13mm right common iliac artery lymph node  7/24/09:  "right pelvic sidewall mass - biopsy revealed adipose tissue with metastatic adenocarcinoma consistent with given clinical history of patient's carcinoma of ovary.     4/11/17: pathology consult   FINAL DIAGNOSIS:   CASE FROM [facility], [place], [place] ([id number],   NEED TO VERIFY DATE OBTAINED):     SOFT TISSUE, RIGHT PELVIC SIDEWALL MASS, BIOPSY:   - Adenocarcinoma (see comment)   - One reactive lymph node negative for carcinoma (0/1)   COMMENT:   Tissue sections show adenocarcinoma that is morphologically compatible   with endometrioid morphology.  The adenocarcinoma involves   mesothelial-lined fibroadipose tissue and a portion of an uninvolved   lymph node is also present.  No lymph-vascular invasion is identified.     Summer 2016: had severe UTI and e. Coli infection. CT scan done in [place] which revealed \"small spot\" in pelvis, not representative of recurrent disease.  was 10.6 at this time    December 2016: Moved back to [place]    [facility] which is higher than usual for her    3/7/2017: CT CAP  Impression:  1. No evidence of residual/recurrent disease in chest, abdomen and pelvis.   2. 1.3 cm nodule left lobe of thyroid which may be evaluated by US thyroid if not done in the past.       Today: Patient is doing well with no complaints. She is inquiring about follow-up plans from now on since she has not had any recurrences since.          Date Value Ref Range Status   05/24/2018 12 0 - 30 U/mL Final     Comment:     Assay Method:  Chemiluminescence using Siemens Centaur XP   03/30/2017 16 0 - 30 U/mL Final     Comment:     Assay Method:  Chemiluminescence using Siemens Centaur XP       ROS:    ROS: 10 point ROS neg other than the symptoms noted above in the HPI.    I have reviewed and addressed the patient's review of symptoms for today's visit.     Past Medical History:    Past Medical History:   Diagnosis Date     DVT (deep vein thrombosis) in " pregnancy (H) 2004    left saphenous vein     Endometriosis      Heartburn      Hemorrhoids      Hyperlipidemia      Hypertension      Insomnia      Major depression      Ovarian cancer (H) 11/2004 and recurred 7/2009     Primary biliary cholangitis (H)          Past Surgical History:    Past Surgical History:   Procedure Laterality Date     AS TOTAL ABDOM HYSTERECTOMY  1985    for endometriosis and fibroids     CHOLECYSTECTOMY  2004     LAPAROSCOPY  2009    for recurrent ovarian cancer     LAPAROTOMY EXPLORATORY  2004    for ovarian cancer     OPEN REDUCTION INTERNAL FIXATION ANKLE Left 1980     SALPINGO-OOPHORECTOMY BILATERAL Bilateral 1985         Health Maintenance:  Health Maintenance Due   Topic Date Due     TETANUS IMMUNIZATION (SYSTEM ASSIGNED)  12/29/1963     HEPATITIS C SCREENING  12/29/1963     LIPID SCREEN Q5 YR FEMALE (SYSTEM ASSIGNED)  12/29/1990     ADVANCE DIRECTIVE PLANNING Q5 YRS  12/29/2000     DEXA SCAN SCREENING (SYSTEM ASSIGNED)  12/29/2010     PNEUMOCOCCAL (1 of 2 - PCV13) 12/29/2010     MAMMO SCREEN Q2 YR (SYSTEM ASSIGNED)  01/01/2016     FALL RISK ASSESSMENT  03/16/2018       Last Pap Smear:     Last Mammogram: 10/2016              Result: normal      She has not had a history of abnormal mammograms.    Last Colonoscopy: Within last 5 years           Result: normal                        Current Medications:     has a current medication list which includes the following prescription(s): aspirin, atorvastatin calcium, cholecalciferol, losartan potassium, multivitamin, therapeutic, omeprazole, polyethylene glycol, ursodiol, venlafaxine hcl, and zolpidem tartrate.       Allergies:        Allergies   Allergen Reactions     Ciprofloxacin Other (See Comments)     Pain in legs and feet     Adhesive Tape      Perfume            Social History:     Social History   Substance Use Topics     Smoking status: Never Smoker     Smokeless tobacco: Never Used     Alcohol use No       History   Drug Use No  "          Family History:     The patient's family history is notable for none.    Family History   Problem Relation Age of Onset     Bipolar Disorder Mother      Suicide Mother          Physical Exam:     /67  Pulse 77  Temp 98.6  F (37  C)  Resp 16  Ht 1.664 m (5' 5.5\")  Wt 78.7 kg (173 lb 9.6 oz)  SpO2 100%  BMI 28.45 kg/m2  Body mass index is 28.45 kg/(m^2).    General Appearance: healthy and alert, no distress     HEENT:  no thyromegaly, no palpable nodules or masses        Cardiovascular: Well perfused     Respiratory: No increased work of breathing    Musculoskeletal: extremities non-tender and without edema. Bilateral scars over both knees from recent knee replacement     Skin: no lesions or rashes     Neurological: normal gait, no gross defects     Psychiatric: appropriate mood and affect                               Hematological: normal cervical, supraclavicular and inguinal lymph nodes     Gastrointestinal:       abdomen soft, non-tender, non-distended, no organomegaly or masses, RLQ scar from appendectomy, vertical midline scar     Genitourinary: External genitalia and urethral meatus appears normal.  Vagina is atrophic but smooth without nodularity or masses.  Cervix surgically absent.  Bimanual exam reveal no masses, nodularity or fullness.  Recto-vaginal exam confirms these findings.      Assessment:    Nohemy Gurrola is a 72 year old woman with a history of stage IIIc ovarian cancer.       Plan:     1.)    RTC to see me in 1 year for surveillance visit as  pending.      2.) Genetic risk factors were assessed.    3.) Labs and/or tests ordered include:   today.     4.) Health maintenance issues addressed today include none.      Thank you for allowing us to participate in the care of your patient.       I, Afua Tovar MS4, am serving as a scribe to document services personally performed by Evi Boyd MD, based upon my observations and the provider's statements to me. " All documentation has been reviewed by the aforementioned doctor prior to being entered into the official medical record.     I have reviewed the above note and agree with the scribe's notation as written.    Of a 30 minute appointment, more than 50% was spent in counseling the patient.    Evi Boyd MD    Department of Ob/Gyn and Women's Health  Division of Gynecologic Oncology  [facility]  [phone]

## 2018-06-13 ENCOUNTER — TELEPHONE (OUTPATIENT)
Dept: ONCOLOGY | Facility: CLINIC | Age: 73
End: 2018-06-13

## 2018-06-13 NOTE — TELEPHONE ENCOUNTER
----- Message from Evi Boyd MD sent at 6/13/2018  7:30 AM CDT -----  Hi I was looking over images from 3/2017 from CT scan patient had in [place]. There was a 1.3 cm nodule in thyroid. Can you please see if patient ever had US to follow up this?  Evi Sahni

## 2018-06-13 NOTE — TELEPHONE ENCOUNTER
Spoke with pt   Informed on ca125 results  Also discussed CT results from last years reread  Explained that thyroid nodule was noted and I did see it noted on the [facility]  Asked pt if this was ever followed up on this  Pt states she was not aware of any thyroid nodule or being told by [facility] that this was on the scan  Ad[facility] to call [facility] and discuss this follow up with them  Pt reports understanding

## 2019-05-20 NOTE — PROGRESS NOTES
"            Follow Up Notes on Referred Patient    Date: 2019      RE: Nohemy Gurrola  : 1945  LORENA: 2019      Nohemy Gurrola is a 73 year old woman with a history of recurrent stage IIIc ovarian cancer; treated in  and . She is here for an annual surveillance visit.      Brief Oncology History:     Ms Gurrola had an open TLH-BSO in  due to endometriosis and was on premarin for 19 years until  when she was taken off. She was originally diagnosed with stage IIIc ovarian cancer in  - timeline below. She takes a baby aspirin daily. No fx of ovarian or breast cancer. No hx of diabetes, heart or lung disease. She is an only child and does not have any children. She continues to take miralax for radiation proctitis.       - dianosed with stage IIIc ovarian cancer  - presented with blood clot, CT scan revealed right sided pelvic mass measuring 7 x 6 cm with obstruction of ureter.   - CEA    - November: removal of pelvic mass, 2/5 lymph nodes revealed metastatic adenocarcinoma, no lymphvascular space invasion   - treated surgically and with chemo (carbo/taxol) with Dr. More in [place].   - initial   1348     [id number]: disease free     2009:  increased to 18.2     2009: interval increase in size of mass. Dr. More was worried that disease had recurred.      2009: diagnostic laparoscopy revealed extensive adhesions, underwent debulking and additional carbo/taxol plus radiation to pelvis      09: MRI pelvis Impression:  1. Right sided 3.3 cm pelvic mass worrisome for recurrent disease  2. Solitary prominent 13mm right common iliac artery lymph node  09: right pelvic sidewall mass - biopsy revealed adipose tissue with metastatic adenocarcinoma consistent with given clinical history of patient's carcinoma of ovary.     Summer 2016: had severe UTI and e. Coli infection. CT scan done in [place] which revealed \"small spot\" in pelvis, not " representative of recurrent disease.  was 10.6 at that time.     December 2016: Moved back to [place]     [facility] 26 which is higher than usual for her     3/7/2017: CT CAP Impression:  1. No evidence of residual/recurrent disease in chest, abdomen and pelvis.   2. 1.3 cm nodule left lobe of thyroid which may be evaluated by US thyroid if not done in the past.     4/11/17: pathology consult   FINAL DIAGNOSIS:   CASE FROM [facility], [place], [place] ([id number], NEED TO VERIFY DATE OBTAINED):     SOFT TISSUE, RIGHT PELVIC SIDEWALL MASS, BIOPSY:   - Adenocarcinoma (see comment)   - One reactive lymph node negative for carcinoma (0/1)   COMMENT:   Tissue sections show adenocarcinoma that is morphologically compatible with endometrioid morphology.  The adenocarcinoma involves mesothelial-lined fibroadipose tissue and a portion of an uninvolved lymph node is also present.  No lymph-vascular invasion is identified.    5/24/18:   12.   5/21/19:  pending.         Today she comes to clinic and denies any concerns. She denies any vaginal bleeding, no changes in her bowel (takes Miralax prn for constipation) or bladder habits, no nausea/emesis, no lower extremity edema, and no difficulties eating or sleeping. She denies any abdominal discomfort/bloating, no fevers or chills, and no chest pain or shortness of breath. [facility] She is not sexually active. She has DDD and does have some LBP from this.       Review of Systems:    Systemic           no weight changes; no fever; no chills; no night sweats; no appetite changes  Skin           no rashes, or lesions  Eye           no irritation; no changes in vision  Lara-Laryngeal           no dysphagia; no hoarseness   Pulmonary    no cough; no shortness of breath  Cardiovascular    no chest pain; no palpitations; + HTN  Gastrointestinal    no diarrhea; no constipation; no  abdominal pain; no changes in bowel habits; no blood in stool; + GERD  Genitourinary   no urinary frequency; no urinary urgency; no dysuria; no pain; no abnormal vaginal discharge; no abnormal vaginal bleeding  Breast    no breast discharge; no breast changes; no breast pain  Musculoskeletal    no myalgias; no arthralgias; + back pain  Psychiatric           no depressed mood; no anxiety    Hematologic              no tender lymph nodes; no noticeable swellings or lumps   Endocrine    no hot flashes; no heat/cold intolerance         Neurological   no tremor; + numbness and tingling; no headaches; no difficulty sleeping      Past Medical History:    Past Medical History:   Diagnosis Date     DVT (deep vein thrombosis) in pregnancy (H) 2004    left saphenous vein     Endometriosis      Heartburn      Hemorrhoids      Hyperlipidemia      Hypertension      Insomnia      Major depression      Ovarian cancer (H) 11/2004 and recurred 7/2009     Primary biliary cholangitis (H)          Past Surgical History:    Past Surgical History:   Procedure Laterality Date     AS TOTAL ABDOM HYSTERECTOMY  1985    for endometriosis and fibroids     CHOLECYSTECTOMY  2004     LAPAROSCOPY  2009    for recurrent ovarian cancer     LAPAROTOMY EXPLORATORY  2004    for ovarian cancer     OPEN REDUCTION INTERNAL FIXATION ANKLE Left 1980     SALPINGO-OOPHORECTOMY BILATERAL Bilateral 1985         Health Maintenance Due   Topic Date Due     DEXA  1945     HEPATITIS C SCREENING  1945     ADVANCED DIRECTIVE PLANNING  1945     DEPRESSION ACTION PLAN  1945     PHQ-9  1945     DTAP/TDAP/TD IMMUNIZATION (1 - Tdap) 12/29/1970     LIPID  12/29/1990     ZOSTER IMMUNIZATION (1 of 2) 12/29/1995     MEDICARE ANNUAL WELLNESS VISIT  12/29/2010     FALL RISK ASSESSMENT  03/16/2018       Current Medications:     Current Outpatient Medications   Medication Sig Dispense Refill     acetaminophen (TYLENOL) 500 MG tablet Take 1,000 mg by  "mouth       ASPIRIN PO Take 81 mg by mouth daily       ATORVASTATIN CALCIUM PO Take 10 mg by mouth        Cholecalciferol (VITAMIN D3 PO) Take by mouth daily       LOSARTAN POTASSIUM PO Take 100 mg by mouth        MAGNESIUM PO        multivitamin, therapeutic (THERA-VIT) TABS Take 1 tablet by mouth daily Reported on 3/30/2017       omeprazole (PRILOSEC) 20 MG CR capsule Take 20 mg by mouth daily        polyethylene glycol (MIRALAX/GLYCOLAX) powder TK 17 GRAMS DISSOLVED IN 6 OUNCES WATER ONCE D PRF CONSTIPATION  11     URSODIOL PO Take by mouth 2 times daily       Venlafaxine HCl (EFFEXOR PO) Take 150 mg by mouth daily        B Complex Vitamins (VITAMIN B COMPLEX PO) Take 1 capsule by mouth           Allergies:        Allergies   Allergen Reactions     Ciprofloxacin Other (See Comments)     Pain in legs and feet     Adhesive Tape      Perfume         Social History:     Social History     Tobacco Use     Smoking status: Never Smoker     Smokeless tobacco: Never Used   Substance Use Topics     Alcohol use: No     Alcohol/week: 0.0 oz       History   Drug Use No         Family History:     The patient's family history is notable for:    Family History   Problem Relation Age of Onset     Bipolar Disorder Mother      Suicide Mother          Physical Exam:     /79   Pulse 70   Temp 98.9  F (37.2  C) (Oral)   Resp 16   Ht 1.664 m (5' 5.5\")   Wt 85.3 kg (188 lb)   SpO2 98%   BMI 30.81 kg/m    Body mass index is 30.81 kg/m .    General Appearance: healthy and alert, no distress     HEENT: no thyromegaly, no palpable nodules or masses        Cardiovascular: regular rate and rhythm, no gallops, rubs or murmurs     Respiratory: lungs clear, no rales, rhonchi or wheezes, normal diaphragmatic excursion    Musculoskeletal: extremities non tender and without edema    Skin: no lesions or rashes     Neurological: normal gait, no gross defects     Psychiatric: appropriate mood and affect                             "   Hematological: normal cervical, supraclavicular and inguinal lymph nodes     Gastrointestinal:       abdomen soft, non-tender, non-distended, no organomegaly or masses    Genitourinary: External genitalia and urethral meatus appears normal.  Vagina is smooth without nodularity or masses.  Cervix surgically absent.  Bimanual exam reveal no masses, nodularity or fullness; RLQ with scar tissue limited exam.  Recto-vaginal exam confirms these findings.      Assessment:    Nohemy Gurrola is a 73 year old woman with a history of recurrent stage IIIc ovarian cancer; treated in 2004 and 2009. She is here for an annual surveillance visit.     15 minutes were spent with this patient, over 50% of that time was spent in symptom management, treatment planning and in counseling and coordination of care.      Plan:     1.)        Continue with annual pelvic/rectal exam and ; today's is pending. She prefers to continue to be seen here. Reviewed signs and symptoms for when she should contact the clinic or seek additional care. Patient to contact the clinic with any questions or concerns in the interim.     2.) Genetic risk factors were assessed and she does meet requirements for referral per NCCN guidelines that all women diagnosed with epithelial ovarian cancer be evaluated by a genetic counselor. This was discussed today and she will think about this and contact the clinic if she decides to pursue this.       3.) Labs and/or tests ordered include:  .      4.) Health maintenance issues addressed today include annual health maintenance and non-gynecologic issues with PCP.    TIMBO Howell, WHNP-BC, ANP-BC  Women's Health Nurse Practitioner  Adult Nurse Pracitioner  Division of Gynecologic Oncology          CC  Patient Care Team:  Outside, Provider as PCP - Lalita Skelton MD as Assigned PCP  SELF, REFERRED

## 2019-05-21 ENCOUNTER — ONCOLOGY VISIT (OUTPATIENT)
Dept: ONCOLOGY | Facility: CLINIC | Age: 74
End: 2019-05-21
Attending: NURSE PRACTITIONER
Payer: MEDICARE

## 2019-05-21 VITALS
BODY MASS INDEX: 30.22 KG/M2 | WEIGHT: 188 LBS | DIASTOLIC BLOOD PRESSURE: 79 MMHG | HEIGHT: 66 IN | TEMPERATURE: 98.9 F | HEART RATE: 70 BPM | RESPIRATION RATE: 16 BRPM | OXYGEN SATURATION: 98 % | SYSTOLIC BLOOD PRESSURE: 121 MMHG

## 2019-05-21 DIAGNOSIS — C56.9 MALIGNANT NEOPLASM OF OVARY, UNSPECIFIED LATERALITY (H): ICD-10-CM

## 2019-05-21 DIAGNOSIS — C56.9 MALIGNANT NEOPLASM OF OVARY, UNSPECIFIED LATERALITY (H): Primary | ICD-10-CM

## 2019-05-21 PROBLEM — Z82.69 FH: TOTAL KNEE REPLACEMENT: Status: ACTIVE | Noted: 2017-05-12

## 2019-05-21 PROBLEM — M17.9 OA (OSTEOARTHRITIS) OF KNEE: Status: ACTIVE | Noted: 2017-05-12

## 2019-05-21 PROBLEM — R52 PAIN MANAGEMENT: Status: ACTIVE | Noted: 2017-05-12

## 2019-05-21 PROBLEM — K21.9 GERD (GASTROESOPHAGEAL REFLUX DISEASE): Status: ACTIVE | Noted: 2017-05-12

## 2019-05-21 LAB — CANCER AG125 SERPL-ACNC: 15 U/ML (ref 0–30)

## 2019-05-21 PROCEDURE — G0463 HOSPITAL OUTPT CLINIC VISIT: HCPCS | Mod: ZF

## 2019-05-21 PROCEDURE — 99213 OFFICE O/P EST LOW 20 MIN: CPT | Mod: ZP | Performed by: NURSE PRACTITIONER

## 2019-05-21 PROCEDURE — 86304 IMMUNOASSAY TUMOR CA 125: CPT | Performed by: NURSE PRACTITIONER

## 2019-05-21 RX ORDER — ACETAMINOPHEN 500 MG
1000 TABLET ORAL
COMMUNITY

## 2019-05-21 ASSESSMENT — MIFFLIN-ST. JEOR: SCORE: 1366.57

## 2019-05-21 ASSESSMENT — PAIN SCALES - GENERAL: PAINLEVEL: NO PAIN (0)

## 2019-05-21 NOTE — NURSING NOTE
Chief Complaint   Patient presents with     Blood Draw     Labs drawn via VPT by RN in lab.      Labs collected from venipuncture by RN.     Rita ROSADO RN PHN BSN  BMT/Oncology Lab

## 2019-05-21 NOTE — NURSING NOTE
"Oncology Rooming Note    May 21, 2019 1:30 PM   Nohemy Gurrola is a 73 year old female who presents for:    Chief Complaint   Patient presents with     Oncology Clinic Visit     Return Ovarain Ca     Initial Vitals: /79   Pulse 70   Temp 98.9  F (37.2  C) (Oral)   Resp 16   Ht 1.664 m (5' 5.5\")   Wt 85.3 kg (188 lb)   SpO2 98%   BMI 30.81 kg/m   Estimated body mass index is 30.81 kg/m  as calculated from the following:    Height as of this encounter: 1.664 m (5' 5.5\").    Weight as of this encounter: 85.3 kg (188 lb). Body surface area is 1.99 meters squared.  No Pain (0) Comment: Data Unavailable   No LMP recorded. Patient has had a hysterectomy.  Allergies reviewed: Yes  Medications reviewed: Yes    Medications: Medication refills not needed today.  Pharmacy name entered into [employer]: [id number] - [place], [place] - [address] AT [facility] [address]    Clinical concerns: 1 year check up       Huyen Hutchins CMA              "

## 2019-05-21 NOTE — LETTER
2019     RE: Nohemy Gurrola  [address]  [address]  [place] [address]     Dear Colleague,    Thank you for referring your patient, Nohemy Gurrola, to the [facility]. Please see a copy of my visit note below.                Follow Up Notes on Referred Patient    Date: 2019      RE: Nohemy Gurrola  : 1945  LORENA: 2019      Nohemy Gurrola is a 73 year old woman with a history of recurrent stage IIIc ovarian cancer; treated in  and . She is here for an annual surveillance visit.      Brief Oncology History:     Ms Gurrola had an open TLH-BSO in  due to endometriosis and was on premarin for 19 years until  when she was taken off. She was originally diagnosed with stage IIIc ovarian cancer in  - timeline below. She takes a baby aspirin daily. No fx of ovarian or breast cancer. No hx of diabetes, heart or lung disease. She is an only child and does not have any children. She continues to take miralax for radiation proctitis.       - dianosed with stage IIIc ovarian cancer  - presented with blood clot, CT scan revealed right sided pelvic mass measuring 7 x 6 cm with obstruction of ureter.   - CEA    - November: removal of pelvic mass, 2/5 lymph nodes revealed metastatic adenocarcinoma, no lymphvascular space invasion   - treated surgically and with chemo (carbo/taxol) with Dr. More in [place].   - initial   1348     [id number]: disease free     2009:  increased to 18.2     2009: interval increase in size of mass. Dr. Mroe was worried that disease had recurred.      2009: diagnostic laparoscopy revealed extensive adhesions, underwent debulking and additional carbo/taxol plus radiation to pelvis      09: MRI pelvis Impression:  1. Right sided 3.3 cm pelvic mass worrisome for recurrent disease  2. Solitary prominent 13mm right common iliac artery lymph node  09: right pelvic sidewall mass - biopsy revealed  "adipose tissue with metastatic adenocarcinoma consistent with given clinical history of patient's carcinoma of ovary.     Summer 2016: had severe UTI and e. Coli infection. CT scan done in [place] which revealed \"small spot\" in pelvis, not representative of recurrent disease.  was 10.6 at that time.     December 2016: Moved back to [place]     [facility] which is higher than usual for her     3/7/2017: CT CAP Impression:  1. No evidence of residual/recurrent disease in chest, abdomen and pelvis.   2. 1.3 cm nodule left lobe of thyroid which may be evaluated by US thyroid if not done in the past.     4/11/17: pathology consult   FINAL DIAGNOSIS:   CASE FROM [facility], [place], [place] ([id number], NEED TO VERIFY DATE OBTAINED):     SOFT TISSUE, RIGHT PELVIC SIDEWALL MASS, BIOPSY:   - Adenocarcinoma (see comment)   - One reactive lymph node negative for carcinoma (0/1)   COMMENT:   Tissue sections show adenocarcinoma that is morphologically compatible with endometrioid morphology.  The adenocarcinoma involves mesothelial-lined fibroadipose tissue and a portion of an uninvolved lymph node is also present.  No lymph-vascular invasion is identified.    5/24/18:   12.   5/21/19:  pending.         Today she comes to clinic and denies any concerns. She denies any vaginal bleeding, no changes in her bowel (takes Miralax prn for constipation) or bladder habits, no nausea/emesis, no lower extremity edema, and no difficulties eating or sleeping. She denies any abdominal discomfort/bloating, no fevers or chills, and no chest pain or shortness of breath. [facility] She is not sexually active. She has DDD and does have some LBP from this.       Review of Systems:    Systemic           no weight changes; no fever; no chills; no night sweats; no appetite changes  Skin           no rashes, or lesions  Eye           no " irritation; no changes in vision  Lara-Laryngeal           no dysphagia; no hoarseness   Pulmonary    no cough; no shortness of breath  Cardiovascular    no chest pain; no palpitations; + HTN  Gastrointestinal    no diarrhea; no constipation; no abdominal pain; no changes in bowel habits; no blood in stool; + GERD  Genitourinary   no urinary frequency; no urinary urgency; no dysuria; no pain; no abnormal vaginal discharge; no abnormal vaginal bleeding  Breast    no breast discharge; no breast changes; no breast pain  Musculoskeletal    no myalgias; no arthralgias; + back pain  Psychiatric           no depressed mood; no anxiety    Hematologic              no tender lymph nodes; no noticeable swellings or lumps   Endocrine    no hot flashes; no heat/cold intolerance         Neurological   no tremor; + numbness and tingling; no headaches; no difficulty sleeping      Past Medical History:    Past Medical History:   Diagnosis Date     DVT (deep vein thrombosis) in pregnancy (H) 2004    left saphenous vein     Endometriosis      Heartburn      Hemorrhoids      Hyperlipidemia      Hypertension      Insomnia      Major depression      Ovarian cancer (H) 11/2004 and recurred 7/2009     Primary biliary cholangitis (H)          Past Surgical History:    Past Surgical History:   Procedure Laterality Date     AS TOTAL ABDOM HYSTERECTOMY  1985    for endometriosis and fibroids     CHOLECYSTECTOMY  2004     LAPAROSCOPY  2009    for recurrent ovarian cancer     LAPAROTOMY EXPLORATORY  2004    for ovarian cancer     OPEN REDUCTION INTERNAL FIXATION ANKLE Left 1980     SALPINGO-OOPHORECTOMY BILATERAL Bilateral 1985         Health Maintenance Due   Topic Date Due     DEXA  1945     HEPATITIS C SCREENING  1945     ADVANCED DIRECTIVE PLANNING  1945     DEPRESSION ACTION PLAN  1945     PHQ-9  1945     DTAP/TDAP/TD IMMUNIZATION (1 - Tdap) 12/29/1970     LIPID  12/29/1990     ZOSTER IMMUNIZATION (1 of 2)  "12/29/1995     MEDICARE ANNUAL WELLNESS VISIT  12/29/2010     FALL RISK ASSESSMENT  03/16/2018       Current Medications:     Current Outpatient Medications   Medication Sig Dispense Refill     acetaminophen (TYLENOL) 500 MG tablet Take 1,000 mg by mouth       ASPIRIN PO Take 81 mg by mouth daily       ATORVASTATIN CALCIUM PO Take 10 mg by mouth        Cholecalciferol (VITAMIN D3 PO) Take by mouth daily       LOSARTAN POTASSIUM PO Take 100 mg by mouth        MAGNESIUM PO        multivitamin, therapeutic (THERA-VIT) TABS Take 1 tablet by mouth daily Reported on 3/30/2017       omeprazole (PRILOSEC) 20 MG CR capsule Take 20 mg by mouth daily        polyethylene glycol (MIRALAX/GLYCOLAX) powder TK 17 GRAMS DISSOLVED IN 6 OUNCES WATER ONCE D PRF CONSTIPATION  11     URSODIOL PO Take by mouth 2 times daily       Venlafaxine HCl (EFFEXOR PO) Take 150 mg by mouth daily        B Complex Vitamins (VITAMIN B COMPLEX PO) Take 1 capsule by mouth           Allergies:        Allergies   Allergen Reactions     Ciprofloxacin Other (See Comments)     Pain in legs and feet     Adhesive Tape      Perfume         Social History:     Social History     Tobacco Use     Smoking status: Never Smoker     Smokeless tobacco: Never Used   Substance Use Topics     Alcohol use: No     Alcohol/week: 0.0 oz       History   Drug Use No         Family History:     The patient's family history is notable for:    Family History   Problem Relation Age of Onset     Bipolar Disorder Mother      Suicide Mother          Physical Exam:     /79   Pulse 70   Temp 98.9  F (37.2  C) (Oral)   Resp 16   Ht 1.664 m (5' 5.5\")   Wt 85.3 kg (188 lb)   SpO2 98%   BMI 30.81 kg/m     Body mass index is 30.81 kg/m .    General Appearance: healthy and alert, no distress     HEENT: no thyromegaly, no palpable nodules or masses        Cardiovascular: regular rate and rhythm, no gallops, rubs or murmurs     Respiratory: lungs clear, no rales, rhonchi or wheezes, " normal diaphragmatic excursion    Musculoskeletal: extremities non tender and without edema    Skin: no lesions or rashes     Neurological: normal gait, no gross defects     Psychiatric: appropriate mood and affect                               Hematological: normal cervical, supraclavicular and inguinal lymph nodes     Gastrointestinal:       abdomen soft, non-tender, non-distended, no organomegaly or masses    Genitourinary: External genitalia and urethral meatus appears normal.  Vagina is smooth without nodularity or masses.  Cervix surgically absent.  Bimanual exam reveal no masses, nodularity or fullness; RLQ with scar tissue limited exam.  Recto-vaginal exam confirms these findings.      Assessment:    Nohemy Gurrola is a 73 year old woman with a history of recurrent stage IIIc ovarian cancer; treated in 2004 and 2009. She is here for an annual surveillance visit.     15 minutes were spent with this patient, over 50% of that time was spent in symptom management, treatment planning and in counseling and coordination of care.      Plan:   1.)        Continue with annual pelvic/rectal exam and ; today's is pending. She prefers to continue to be seen here. Reviewed signs and symptoms for when she should contact the clinic or seek additional care. Patient to contact the clinic with any questions or concerns in the interim.     2.) Genetic risk factors were assessed and she does meet requirements for referral per NCCN guidelines that all women diagnosed with epithelial ovarian cancer be evaluated by a genetic counselor. This was discussed today and she will think about this and contact the clinic if she decides to pursue this.       3.) Labs and/or tests ordered include:  .      4.) Health maintenance issues addressed today include annual health maintenance and non-gynecologic issues with PCP.    TIMBO Howell, WHNP-BC, ANP-BC  Women's Health Nurse Practitioner  Adult Nurse Pracitioner  Division of  Gynecologic Oncology    CC  Patient Care Team:  Outside, Provider as PCP - General  Lalita Mendoza MD as Assigned PCP  SELF, REFERRED

## 2020-03-10 ENCOUNTER — HEALTH MAINTENANCE LETTER (OUTPATIENT)
Age: 75
End: 2020-03-10

## 2020-05-18 NOTE — PROGRESS NOTES
"Nohemy Gurrola is a 74 year old female who is being evaluated via a billable telephone visit.      The patient has been notified of following:     \"This telephone visit will be conducted via a call between you and your physician/provider. We have found that certain health care needs can be provided without the need for a physical exam.  This service lets us provide the care you need with a short phone conversation.  If a prescription is necessary we can send it directly to your pharmacy.  If lab work is needed we can place an order for that and you can then stop by our lab to have the test done at a later time.    Telephone visits are billed at different rates depending on your insurance coverage. During this emergency period, for some insurers they may be billed the same as an in-person visit.  Please reach out to your insurance provider with any questions.    If during the course of the call the physician/provider feels a telephone visit is not appropriate, you will not be charged for this service.\"    Patient has given verbal consent for Telephone visit?  Yes    What phone number would you like to be contacted at? [phone]    How would you like to obtain your AVS? Mail a copy    I have reviewed and updated the patient's allergies and medication list.    Concerns: Pt has had a few UTI's recently, which [facility] which came [facility] E[facility].    Pt would also like a  drawn soon.    Refills: None       PEDRITO Perez            Phone call duration: 20 minutes                      Follow Up Notes on Referred Patient    Date: 2020        RE: Nohemy Gurrola  : 1945  LORENA: 2020    Nohemy Gurrola is a 74 year old woman with a history of recurrent stage IIIc ovarian cancer; treated in  and . Based upon CDC guidance and to observe social distancing in the setting of the COVID-19 pandemic, the patient was seen virtually via phone visit.      Brief Oncology History:     Ms Gurrola " "had an open TLH-BSO in 1985 due to endometriosis and was on premarin for 19 years until 2002 when she was taken off. She was originally diagnosed with stage IIIc ovarian cancer in 2004 - timeline below. She takes a baby aspirin daily. No fx of ovarian or breast cancer. No hx of diabetes, heart or lung disease. She is an only child and does not have any children. She continues to take miralax for radiation proctitis.      2004 - diagnosed with stage IIIc ovarian cancer  - presented with blood clot, CT scan revealed right sided pelvic mass measuring 7 x 6 cm with obstruction of ureter.   - CEA  106  - November: removal of pelvic mass, 2/5 lymph nodes revealed metastatic adenocarcinoma, no lymphvascular space invasion   - treated surgically and with chemo (carbo/taxol) with Dr. More in [place].   - initial   1348     [id number]: disease free     January 2009:  increased to 18.2     June 2009: interval increase in size of mass. Dr. More was worried that disease had recurred.      July 2009: diagnostic laparoscopy revealed extensive adhesions, underwent debulking and additional carbo/taxol plus radiation to pelvis      7/16/09: MRI pelvis Impression:  1. Right sided 3.3 cm pelvic mass worrisome for recurrent disease  2. Solitary prominent 13mm right common iliac artery lymph node  7/24/09: right pelvic sidewall mass - biopsy revealed adipose tissue with metastatic adenocarcinoma consistent with given clinical history of patient's carcinoma of ovary.      Summer 2016: had severe UTI and e. Coli infection. CT scan done in [place] which revealed \"small spot\" in pelvis, not representative of recurrent disease.  was 10.6 at that time.     December 2016: Moved back to [place]     [facility] 26 which is higher than usual for her     3/7/2017: CT CAP Impression:  1. No evidence of residual/recurrent disease in chest, abdomen and pelvis.   2. 1.3 cm nodule left lobe of thyroid which may be " evaluated by US thyroid if not done in the past.      4/11/17: pathology consult   FINAL DIAGNOSIS:   CASE FROM [facility], [place], [place] ([id number], NEED TO VERIFY DATE OBTAINED):     SOFT TISSUE, RIGHT PELVIC SIDEWALL MASS, BIOPSY:   - Adenocarcinoma (see comment)   - One reactive lymph node negative for carcinoma (0/1)   COMMENT:   Tissue sections show adenocarcinoma that is morphologically compatible with endometrioid morphology.  The adenocarcinoma involves mesothelial-lined fibroadipose tissue and a portion of an uninvolved lymph node is also present.  No lymph-vascular invasion is identified.     5/24/18:  12.   5/21/19:  15.  5/19/2020:  pending.         Today she reports she is feeling well and denies any concerns. She has had 2 UTIs this year thus far and has discussed this with her PCP; her first UTI did not have a culture but her 2nd once was E.Coli.  She denies any vaginal bleeding, no changes in her bowel habits, no nausea/emesis, no lower extremity edema, and no difficulties eating or sleeping. She denies any abdominal discomfort/bloating, no fevers or chills, and no chest pain or shortness of breath. She has recently started on medifast and has lost about 12 pounds.  [facility] to [facility] and misses the interaction with other service members. She would also check her [facility] at [facility] but is not able to do that currently.         Review of Systems:    Systemic           no weight changes; no fever; no chills; no night sweats; no appetite changes  Skin           no rashes, or lesions  Eye           no irritation; no changes in vision  Lara-Laryngeal           no dysphagia; no hoarseness   Pulmonary    no cough; no shortness of breath  Cardiovascular    no chest pain; no palpitations; + HTN  Gastrointestinal    no diarrhea; no constipation; no abdominal pain; no changes in bowel habits; no blood in stool  Genitourinary   no urinary frequency;  no urinary urgency; no dysuria; no pain; no abnormal vaginal discharge; no abnormal vaginal bleeding  Breast    no breast discharge; no breast changes; no breast pain  Musculoskeletal    no myalgias; no arthralgias; no back pain  Psychiatric           no depressed mood; no anxiety    Hematologic              no tender lymph nodes; no noticeable swellings or lumps   Endocrine    no hot flashes; no heat/cold intolerance         Neurological   no tremor; no numbness and tingling; no headaches; no difficulty sleeping      Past Medical History:    Past Medical History:   Diagnosis Date     DVT (deep vein thrombosis) in pregnancy (H) 2004    left saphenous vein     Endometriosis      Heartburn      Hemorrhoids      Hyperlipidemia      Hypertension      Insomnia      Major depression      Ovarian cancer (H) 11/2004 and recurred 7/2009     Primary biliary cholangitis (H)          Past Surgical History:    Past Surgical History:   Procedure Laterality Date     AS TOTAL ABDOM HYSTERECTOMY  1985    for endometriosis and fibroids     CHOLECYSTECTOMY  2004     LAPAROSCOPY  2009    for recurrent ovarian cancer     LAPAROTOMY EXPLORATORY  2004    for ovarian cancer     OPEN REDUCTION INTERNAL FIXATION ANKLE Left 1980     SALPINGO-OOPHORECTOMY BILATERAL Bilateral 1985         Health Maintenance Due   Topic Date Due     DEXA  1945     HEPATITIS C SCREENING  1945     ADVANCE CARE PLANNING  1945     DEPRESSION ACTION PLAN  1945     PHQ-9  1945     DTAP/TDAP/TD IMMUNIZATION (1 - Tdap) 12/29/1970     LIPID  12/29/1990     ZOSTER IMMUNIZATION (1 of 2) 12/29/1995     MEDICARE ANNUAL WELLNESS VISIT  12/29/2010     PNEUMOCOCCAL IMMUNIZATION 65+ LOW/MEDIUM RISK (1 of 2 - PCV13) 12/29/2010     FALL RISK ASSESSMENT  03/16/2018       Current Medications:     Current Outpatient Medications   Medication Sig Dispense Refill     acetaminophen (TYLENOL) 500 MG tablet Take 1,000 mg by mouth       ASPIRIN PO Take 81 mg by  mouth daily       ATORVASTATIN CALCIUM PO Take 10 mg by mouth        B Complex Vitamins (VITAMIN B COMPLEX PO) Take 1 capsule by mouth       Cholecalciferol (VITAMIN D3 PO) Take by mouth daily       LOSARTAN POTASSIUM PO Take 100 mg by mouth        MAGNESIUM PO        multivitamin, therapeutic (THERA-VIT) TABS Take 1 tablet by mouth daily Reported on 3/30/2017       omeprazole (PRILOSEC) 20 MG CR capsule Take 20 mg by mouth daily        polyethylene glycol (MIRALAX/GLYCOLAX) powder TK 17 GRAMS DISSOLVED IN 6 OUNCES WATER ONCE D PRF CONSTIPATION  11     URSODIOL PO Take by mouth 2 times daily       Venlafaxine HCl (EFFEXOR PO) Take 150 mg by mouth daily            Allergies:        Allergies   Allergen Reactions     Ciprofloxacin Other (See Comments)     Pain in legs and feet     Adhesive Tape      Perfume         Social History:     Social History     Tobacco Use     Smoking status: Never Smoker     Smokeless tobacco: Never Used   Substance Use Topics     Alcohol use: No     Alcohol/week: 0.0 standard drinks       History   Drug Use No         Family History:     The patient's family history is notable for:    Family History   Problem Relation Age of Onset     Bipolar Disorder Mother      Suicide Mother          Physical Exam:     There were no vitals taken for this visit.  There is no height or weight on file to calculate BMI.     Psychiatric: appropriate mood and affect                                   Assessment:    Nohemy Gurrola is a 74 year old woman with a history of recurrent stage IIIc ovarian cancer; treated in 2004 and 2009. Based upon CDC guidance and to observe social distancing in the setting of the COVID-19 pandemic, the patient was seen virtually via phone visit.     20 minutes were spent with this patient by phone. 100% of that time was spent in symptom management, treatment planning and in counseling and coordination of care. See rooming note for consent.       Plan:     1.)        Continue with  annual exam and . She will have her  drawn soon. Reviewed signs and symptoms for when she should contact the clinic or seek additional care. Patient to contact the clinic with any questions or concerns in the interim. Discussed considering seeing ID if she has additional UTIs and she should speak with her PCP about this. Answered all of her questions to the best of my ability.      2.) Genetic risk factors were assessed and she does meet requirements for referral per NCCN guidelines that all women diagnosed with epithelial ovarian cancer be evaluated by a genetic counselor. This has previously been discussed with her and she will think about this and contact the clinic if she decides to pursue this. Discussed again today she again declines. Briefly discussed role of Parp I if she were to recur again.       3.) Labs and/or tests ordered include:  .      4.) Health maintenance issues addressed today include annual health maintenance and non-gynecologic issues with PCP.    TIMBO Howell, WHNP-BC, ANP-BC  Women's Health Nurse Practitioner  Adult Nurse Pracitioner  Division of Gynecologic Oncology          CC  Patient Care Team:  Outside, Provider as PCP - General  SELF, REFERRED

## 2020-05-19 ENCOUNTER — VIRTUAL VISIT (OUTPATIENT)
Dept: ONCOLOGY | Facility: CLINIC | Age: 75
End: 2020-05-19
Attending: NURSE PRACTITIONER
Payer: MEDICARE

## 2020-05-19 DIAGNOSIS — C56.9 MALIGNANT NEOPLASM OF OVARY, UNSPECIFIED LATERALITY (H): Primary | ICD-10-CM

## 2020-05-19 PROCEDURE — 40000114 ZZH STATISTIC NO CHARGE CLINIC VISIT

## 2020-05-19 PROCEDURE — 99213 OFFICE O/P EST LOW 20 MIN: CPT | Mod: 95 | Performed by: NURSE PRACTITIONER

## 2020-06-05 DIAGNOSIS — C56.9 MALIGNANT NEOPLASM OF OVARY, UNSPECIFIED LATERALITY (H): ICD-10-CM

## 2020-06-05 LAB — CANCER AG125 SERPL-ACNC: 14 U/ML (ref 0–30)

## 2020-06-05 PROCEDURE — 36415 COLL VENOUS BLD VENIPUNCTURE: CPT | Performed by: NURSE PRACTITIONER

## 2020-06-05 PROCEDURE — 86304 IMMUNOASSAY TUMOR CA 125: CPT | Performed by: NURSE PRACTITIONER

## 2020-12-27 ENCOUNTER — HEALTH MAINTENANCE LETTER (OUTPATIENT)
Age: 75
End: 2020-12-27

## 2021-03-06 ENCOUNTER — HEALTH MAINTENANCE LETTER (OUTPATIENT)
Age: 76
End: 2021-03-06

## 2021-04-24 ENCOUNTER — HEALTH MAINTENANCE LETTER (OUTPATIENT)
Age: 76
End: 2021-04-24

## 2021-06-10 NOTE — PROGRESS NOTES
[facility]      Facility:    [facility] [[id number]]  Code Status: UNKNOWN      Chief Complaint/Reason for Visit:  Chief Complaint   Patient presents with     H & P     Elective right total knee arthroplasty, pain management, depression, hyperlipidemia.       HPI:   Nohemy is a 71 y.o. female who was recently admitted to the hospital for elective right total knee arthroplasty.  This was done secondary to degenerative joint disease with that was refractory to conservative management.  This included multiple cortisone injections and Synvisc injections which she claims that the Synvisc injections likely made it worse.  It then was decided to undergo right total knee arthroplasty.  She was taken to the OR at the [facility] and with good results.  There were no perioperative complications and postoperatively according to the patient she did very well and did not require any blood transfusions.  Patient's pain is being controlled with as needed Tylenol as well as as needed oxycodone every 4 hours.  However she does claim that her pain is not well managed and she is up to a 9 and 10 at night.  She says the oxycodone works originally but after 2-2-1/2 hours her pain is severe again.  She has very little pain at rest about a 6 and a 9 out of 10 when she ambulates.  She underwent the procedure under spinal anesthesia and she is no not having any adverse effects to the oxycodone.  She was treated appropriately and transferred here to the TCU in stable condition.    Past Medical History:  No past medical history on file.        Surgical History:  No past surgical history on file.    Family History:   No family history on file.    Social History:    Social History     Social History     Marital status: Single     Spouse name: N/A     Number of children: N/A     Years of education: N/A     Social History Main Topics     Smoking status: Unknown If Ever Smoked      Smokeless tobacco: Not on file     Alcohol use Not on file     Drug use: Not on file     Sexual activity: Not on file     Other Topics Concern     Not on file     Social History Narrative     No narrative on file          Review of Systems   Constitutional: Negative for activity change, chills and fever.   HENT: Negative for hearing loss.    Eyes: Negative for visual disturbance.   Respiratory: Negative for apnea, chest tightness and shortness of breath.    Cardiovascular: Negative for chest pain and palpitations.   Gastrointestinal: Negative for abdominal pain, constipation, nausea and vomiting.   Endocrine: Negative for polydipsia, polyphagia and polyuria.   Genitourinary: Negative for decreased urine volume and urgency.   Musculoskeletal: Negative for neck pain and neck stiffness.   Skin: Negative for rash.   Hematological: Does not bruise/bleed easily.   Psychiatric/Behavioral: Negative for agitation and behavioral problems.       Vitals:    05/09/17 0651   BP: 133/82   Pulse: 88   Resp: 18   Temp: 97.3  F (36.3  C)   SpO2: 97%       Physical Exam   Constitutional: She is oriented to person, place, and time. She appears well-developed and well-nourished. No distress.   HENT:   Head: Normocephalic and atraumatic.   Nose: Nose normal.   Mouth/Throat: Oropharynx is clear and moist. No oropharyngeal exudate.   Eyes: Conjunctivae are normal. Right eye exhibits no discharge. Left eye exhibits no discharge. No scleral icterus.   Neck: Neck supple. No tracheal deviation present. No thyromegaly present.   Cardiovascular: Normal rate, regular rhythm and normal heart sounds.    Pulmonary/Chest: Effort normal.   Abdominal: Soft. Bowel sounds are normal. She exhibits no distension. There is no tenderness.   Musculoskeletal:   Right knee has a hydrocolloid dressing in it and is somewhat swollen.  There is no signs of any infection or discharge.   Lymphadenopathy:     She has no cervical adenopathy.   Neurological: She is  alert and oriented to person, place, and time. She displays normal reflexes. No cranial nerve deficit. She exhibits normal muscle tone. Coordination normal.   Skin: Skin is warm and dry. She is not diaphoretic.   Psychiatric: She has a normal mood and affect. Her behavior is normal.       Medication List:  No current outpatient prescriptions on file.       Labs: There were no laboratory values available on chart at this time.      Assessment:    ICD-10-CM    1. History of total knee arthroplasty, right Z96.651    2. Pain management R52    3. Depression F32.9    4. Hyperlipidemia E78.5        Plan: Plan at this time is to change her pain regime.  We will start maximizing nonpharmacologic modalities including a cool pack to her knee 5 times a day ×30 minutes and as needed.  I will also change the Tylenol to 650 mg scheduled instead of as needed and this will be done every 6 hours.  I will keep the oxycodone orders however I will move it to every 3 hours as needed and staff will do pain assessments every 3 hours and monitor mental status.  We will remove the hydrocolloid dressing today as ordered by orthopedics and her depression is under control.  I did not address her hyperlipidemia at this time that is a chronic condition.  No other changes to care plan at this time and I will continue to monitor above medical problems.        Electronically signed by: Clark Riley DO

## 2021-06-10 NOTE — PROGRESS NOTES
[facility]    Facility:   [facility] [[id number]]   Code Status: FULL CODE      CHIEF COMPLAINT/REASON FOR VISIT:  Chief Complaint   Patient presents with     Follow Up     knee, pain       HISTORY:      HPI: Nohemy is a 71 y.o. female who I had the pleasure to visit with today secondary to her hospitalization May 2 - May 5, 2017 secondary to primary principal diagnosis of right knee osteoarthritis status post right total knee arthroplasty in May 2, 2017.  She did have end-stage osteoarthritis of the right knee and she did have discussion and knowledge of the treatments benefits and alternatives.  She did well postoperatively to complete a 24-hour course of antibiotics.  Currently for DVT T prophylaxis she is on Lovenox.  Hemoglobin was stable at 9.0 on May 4, 2017.  She does have also a history of primary biliary cholangitis currently being treated.  A history of insomnia.  History of basal cell carcinoma of the skin.  Currently enrolled in therapy to improve her strength balance and overall conditioning.  She already can bend her knee at 106 .  The staples are clean dry and present.  She is getting Tylenol every 6 hours.  Her pain still seems to be elevated she does have a as needed getting about 5 doses a day we will now per her request given every 3 hours 5 mg during the day starting at 6:00 in the morning and she can have extra dose as needed.  She does see orthopedics next Monday.  She would like to follow-up for a couple L always over the weekend.  She does take Ambien for nighttime sleep.  She has been normotensive and afebrile and also on room air.    Past Medical History:   Diagnosis Date     Basal cell carcinoma      Depression      Endometriosis      GERD (gastroesophageal reflux disease)      Insomnia      Osteoarthritis of knee      Ovarian cancer      Primary biliary cholangitis              No family history on file.  Social History     Social History     Marital  status: Single     Spouse name: N/A     Number of children: N/A     Years of education: N/A     Social History Main Topics     Smoking status: Unknown If Ever Smoked     Smokeless tobacco: Not on file     Alcohol use No     Drug use: Not on file     Sexual activity: Not on file     Other Topics Concern     Not on file     Social History Narrative         Review of Systems  Currently she denies chills and fever coughing wheezing chest pain dizziness vertigo flulike symptoms nausea vomiting diarrhea dysuria unusual myalgias or arthralgias except for the surgical pain.  A history of primary biliary cholangitis along with hypertension.  Current Outpatient Prescriptions   Medication Sig     acetaminophen (TYLENOL) 325 MG tablet Take 650 mg by mouth every 4 (four) hours as needed for pain.     [START ON 6/1/2017] aspirin 81 MG EC tablet Take 81 mg by mouth daily.     atorvastatin (LIPITOR) 10 MG tablet Take 10 mg by mouth at bedtime.     b complex vitamins capsule Take 1 capsule by mouth every morning.     bisacodyl (DULCOLAX) 10 mg suppository Insert 10 mg into the rectum every other day as needed.     cholecalciferol, vitamin D3, (VITAMIN D3) 2,000 unit Tab Take 1 tablet by mouth every morning.     docusate sodium (COLACE) 100 MG capsule Take 100 mg by mouth 2 (two) times a day.     enoxaparin (LOVENOX) 40 mg/0.4 mL syringe Inject 40 mg under the skin every morning.     fluticasone (FLONASE) 50 mcg/actuation nasal spray 1 spray into each nostril at bedtime as needed for rhinitis.     losartan (COZAAR) 100 MG tablet Take 100 mg by mouth at bedtime.     magnesium hydroxide (MILK OF MAG) 400 mg/5 mL Susp suspension Take 30 mL by mouth 2 (two) times a day as needed.     multivitamin (MULTIPLE VITAMINS) per tablet Take 1 tablet by mouth daily.     omeprazole (PRILOSEC) 20 MG capsule Take 20 mg by mouth every morning.     oxyCODONE (ROXICODONE) 5 MG immediate release tablet Take 5-10 mg by mouth every 3 (three) hours.       polyethylene glycol (MIRALAX) 17 gram packet Take 17 g by mouth daily as needed.     sodium phosphates 133 mL (FOR FLEET) 19-7 gram/118 mL Enem rectal enema Insert 1 enema into the rectum every other day as needed for constipation.     ursodiol (ACTIGALL) 500 MG tablet Take 500 mg by mouth 2 (two) times a day.     venlafaxine (EFFEXOR) 100 MG tablet Take 150 mg by mouth every morning.     zolpidem (AMBIEN CR) 12.5 MG CR tablet Take 12.5 mg by mouth at bedtime as needed for sleep.         .  Vitals:    05/12/17 1442   BP: 134/71   Pulse: 87   Resp: 20   Temp: 96.7  F (35.9  C)       Physical Exam   Constitutional: She is oriented to person, place, and time. She appears well-nourished. No distress.   HENT:   Head: Normocephalic.   Eyes: Pupils are equal, round, and reactive to light.   Neck: Neck supple. No thyromegaly present.   Cardiovascular: Normal rate, regular rhythm and normal heart sounds.    Pulmonary/Chest: Breath sounds normal.   Abdominal: Bowel sounds are normal. There is no tenderness. There is no guarding.   Musculoskeletal:   Her right knee actually looks pretty good with no significant edema although there is some swelling but it actually looks pretty good the staples are clean dry and intact.  Good CMS to her right foot.   Lymphadenopathy:     She has no cervical adenopathy.   Neurological: She is oriented to person, place, and time.   Skin: Skin is warm and dry. No rash noted.   Psychiatric: Her behavior is normal.         LABS:   No results found for this or any previous visit.        ASSESSMENT:      ICD-10-CM    1. OA (osteoarthritis) of knee M17.9    2. FH: total knee replacement Z84.89    3. Pain management R52    4. GERD (gastroesophageal reflux disease) K21.9    5. Essential hypertension I10    6. Depression F32.9    7. Primary biliary cholangitis K74.3        PLAN:    At this time we will change up her oxycodone every 3 hours while awake she can have extra as as needed continue the Tylenol  every 6 hours.  Continue to work with therapy.  Making excellent progress.  She wants to be able to go home next week.  Does see orthopedics next Monday the 15th.        Electronically signed by: Michael Duane Johnson, CNP

## 2021-06-10 NOTE — PROGRESS NOTES
[facility]    Facility:   [facility] [[id number]]   Code Status: FULL CODE  PCP: Provider Not in System   Phone: None   Fax: [phone]      CHIEF COMPLAINT/REASON FOR VISIT:  Chief Complaint   Patient presents with     Discharge Summary       HISTORY COURSE:    Nohemy is a 71 y.o. female who was recently admitted to the hospital for elective right total knee arthroplasty. This was done secondary to degenerative joint disease with that was refractory to conservative management. This included multiple cortisone injections and Synvisc injections which she claims that the Synvisc injections likely made it worse. It then was decided to undergo right total knee arthroplasty. She was taken to the OR at the [facility] and with good results. There were no perioperative complications and postoperatively according to the patient she did very well and did not require any blood transfusions. Patient's pain is being controlled with as needed Tylenol as well as as needed oxycodone every 4 hours. However she does claim that her pain is not well managed and she is up to a 9 and 10 at night. She says the oxycodone works originally but after 2-2-1/2 hours her pain is severe again. She has very little pain at rest about a 6 and a 9 out of 10 when she ambulates. She underwent the procedure under spinal anesthesia and she is no not having any adverse effects to the oxycodone. She was treated appropriately and transferred here to the TCU in stable condition.  She has been normotensive afebrile and also on room air.  Did have significant pain at first she did require oxycodone every 3 hours.  For anticoagulation she is on Lovenox daily until June 1.  She did see orthopedics on May 15 the staples were removed she does have Steri-Strips.  The scar looks wonderful no secondary infection.  Able to bend greater than 106 .  Appetite good.  Afebrile and also on room air.  Review of  Systems  Currently she denies chills and fever coughing wheezing chest pain dizziness vertigo flulike symptoms nausea vomiting diarrhea dysuria unusual myalgias or arthralgias except for the surgical pain. A history of primary biliary cholangitis along with hypertension.  Vitals:    05/16/17 1520   BP: 124/67   Pulse: 66   Resp: 20   Temp: 98.2  F (36.8  C)       Physical Exam  Constitutional: She is oriented to person, place, and time. She appears well-nourished. No distress.   HENT:   Head: Normocephalic.   Eyes: Pupils are equal, round, and reactive to light.   Neck: Neck supple. No thyromegaly present.   Cardiovascular: Normal rate, regular rhythm and normal heart sounds.   Pulmonary/Chest: Breath sounds normal.   Abdominal: Bowel sounds are normal. There is no tenderness. There is no guarding.   Musculoskeletal:   Her right knee actually looks pretty good with no significant edema , Good CMS to her right foot.   Lymphadenopathy:   She has no cervical adenopathy.   Neurological: She is oriented to person, place, and time.   Skin: Skin is warm and dry. No rash noted.   Psychiatric: Her behavior is normal.     No results found for: WBC, HGB, HCT, MCV, PLT  No results found for this or any previous visit.        MEDICATION LIST:  Current Outpatient Prescriptions   Medication Sig     acetaminophen (TYLENOL) 325 MG tablet Take 650 mg by mouth every 4 (four) hours as needed for pain.     [START ON 6/1/2017] aspirin 81 MG EC tablet Take 81 mg by mouth daily.     atorvastatin (LIPITOR) 10 MG tablet Take 10 mg by mouth at bedtime.     b complex vitamins capsule Take 1 capsule by mouth every morning.     bisacodyl (DULCOLAX) 10 mg suppository Insert 10 mg into the rectum every other day as needed.     cholecalciferol, vitamin D3, (VITAMIN D3) 2,000 unit Tab Take 1 tablet by mouth every morning.     docusate sodium (COLACE) 100 MG capsule Take 100 mg by mouth 2 (two) times a day.     enoxaparin (LOVENOX) 40 mg/0.4 mL  syringe Inject 40 mg under the skin every morning.     fluticasone (FLONASE) 50 mcg/actuation nasal spray 1 spray into each nostril at bedtime as needed for rhinitis.     losartan (COZAAR) 100 MG tablet Take 100 mg by mouth at bedtime.     magnesium hydroxide (MILK OF MAG) 400 mg/5 mL Susp suspension Take 30 mL by mouth 2 (two) times a day as needed.     multivitamin (MULTIPLE VITAMINS) per tablet Take 1 tablet by mouth daily.     omeprazole (PRILOSEC) 20 MG capsule Take 20 mg by mouth every morning.     oxyCODONE (ROXICODONE) 5 MG immediate release tablet Take 5 mg by mouth every 3 (three) hours as needed for pain.      polyethylene glycol (MIRALAX) 17 gram packet Take 17 g by mouth daily as needed.     sodium phosphates 133 mL (FOR FLEET) 19-7 gram/118 mL Enem rectal enema Insert 1 enema into the rectum every other day as needed for constipation.     ursodiol (ACTIGALL) 500 MG tablet Take 500 mg by mouth 2 (two) times a day.     venlafaxine (EFFEXOR) 100 MG tablet Take 150 mg by mouth every morning.     zolpidem (AMBIEN CR) 12.5 MG CR tablet Take 12.5 mg by mouth at bedtime as needed for sleep.       DISCHARGE DIAGNOSIS:    ICD-10-CM    1. FH: total knee replacement Z84.89    2. Pain management R52    3. Essential hypertension I10    4. Primary biliary cholangitis K74.3        MEDICAL EQUIPMENT NEEDS:  walker    DISCHARGE PLAN/FACE TO FACE:  I certify that services are/were furnished while this patient was under the care of a physician and that a physician or an allowed non-physician practitioner (NPP), had a face-to-face encounter that meets the physician face-to-face encounter requirements. The encounter was in whole, or in part, related to the primary reason for home health. The patient is confined to his/her home and needs intermittent skilled nursing, physical therapy, speech-language pathology, or the continued need for occupational therapy. A plan of care has been established by a physician and is  periodically reviewed by a physician.  Date of Face-to-Face Encounter: May 16, 2017    I certify that, based on my findings, the following services are medically necessary home health services: Discharging to home with current medications with [facility] for physical therapy.      My clinical findings support the need for the above skilled services because: (Please write a brief narrative summary that describes what the RN, PT, SLP, or other services will be doing in the home. A list of diagnoses in this section does not meet the CMS requirements.)  She will require physical therapy     This patient is homebound because: (Please write a brief narrative summary describing the functional limitations as to why this patient is homebound and specifically what makes this patient homebound.)  Secondary to generalized debility    The patient is, or has been, under my care and I have initiated the establishment of the plan of care. This patient will be followed by a physician who will periodically review the plan of care.  She will follow-up with orthopedics in another month her last visit was on May 15.  Steri-Strips are clean and dry.  She is on Lovenox until June 1 and she will start her ASA 81 mg.  She will follow-up with her family practice doctor regarding medication management.  He is hoping to eventually get her left knee surgically replaced as well in the near future.    Discharge coordination of care greater than 30 minutes    Electronically signed by: Michael Duane Johnson, CNP

## 2021-06-14 NOTE — PROGRESS NOTES
[facility]    Facility:   [facility] [[id number]]     Code Status: FULL CODE  PCP: Provider Not in System   Phone: None   Fax: [phone]   [facility] 11/28 - 12/2/17    CHIEF COMPLAINT/REASON FOR VISIT:  Chief Complaint   Patient presents with     Discharge Summary       HISTORY COURSE:  After years of left knee pain, that was progressive and more disabling, she proceeded to have an elective left total knee arthroplasty on November 28, 2017.  She did very well postoperatively.  She used to CP M machine and got good degree of flexion.  DVT prophylaxis with aspirin.  She was at [facility] for a brief amount of therapy.  She did very well and was walking independently with a walker.  She had been on 10 mg of oxycodone every 4 hours around-the-clock due to severe pain.  Day before discharge, we decreased the oxycodone to 5 mg 4 times daily, and up to 10 mg at at bedtime which is roughly midnight for her.  She does not require services upon discharge.  She will do follow-ups at the [facility]     Review of Systems   Pain is well controlled, she does not enjoy the food here is thinking she will do very fine at home not worried about    Vitals:    12/08/17 1514   BP: 132/72   Pulse: 75   Resp: 18   Temp: 98.6  F (37  C)   SpO2: 92%       Physical Exam   Constitutional: . She appears well-nourished. No distress  Cardiovascular: Regular rhythm.    Murmur heard.  Pulmonary/Chest: Breath sounds normal. She has no wheezes. She has no rales.   Abdominal: Soft. Bowel sounds are normal.   Musculoskeletal: She exhibits edema and tenderness.   Right knee wound dry, diffuse tissue swelling, minimal erythema just along the incision line    Neurological: She is  oriented x4. Coordination normal.   Skin: Skin is warm and dry. Incision is healing well, no erythema, minor soft  Tissue swelling.  Psychiatric:  normal mood,affect,thought content normal.             MEDICATION LIST:  Current Outpatient Prescriptions   Medication Sig     oxyCODONE (ROXICODONE) 5 MG immediate release tablet Take 5 mg by mouth 4 (four) times a day.     oxyCODONE (ROXICODONE) 5 MG immediate release tablet Take 10 mg by mouth at bedtime.     acetaminophen (TYLENOL) 500 MG tablet Take 1,000 mg by mouth 3 (three) times a day.     aspirin 81 MG EC tablet Take 162 mg by mouth daily.      atorvastatin (LIPITOR) 10 MG tablet Take 10 mg by mouth at bedtime.     b complex vitamins capsule Take 1 capsule by mouth every morning.     bisacodyl (DULCOLAX) 10 mg suppository Insert 10 mg into the rectum every other day as needed.     cholecalciferol, vitamin D3, (VITAMIN D3) 2,000 unit Tab Take 1 tablet by mouth every morning.     diclofenac sodium (VOLTAREN) 1 % Gel Apply 1 application topically 2 (two) times a day as needed.     docusate sodium (COLACE) 100 MG capsule Take 100 mg by mouth 2 (two) times a day.     ferrous sulfate 325 (65 FE) MG tablet Take 1 tablet by mouth daily with breakfast.     gabapentin (NEURONTIN) 300 MG capsule Take 300 mg by mouth daily.     losartan (COZAAR) 100 MG tablet Take 100 mg by mouth at bedtime.     magnesium hydroxide (MILK OF MAG) 400 mg/5 mL Susp suspension Take 30 mL by mouth 2 (two) times a day as needed.     mesalamine (CANASA) 1000 MG suppository Insert 1,000 mg into the rectum daily as needed.     omeprazole (PRILOSEC) 20 MG capsule Take 20 mg by mouth every morning.     ondansetron (ZOFRAN) 4 MG tablet Take 4 mg by mouth every 6 (six) hours as needed for nausea.     polyethylene glycol (MIRALAX) 17 gram packet Take 17 g by mouth daily.      tretinoin (RETIN-A) 0.01 % gel Apply 1 application topically daily.     ursodiol (ACTIGALL) 300 mg capsule Take 600 mg by mouth 2 (two) times a day.     venlafaxine (EFFEXOR-XR) 150 MG 24 hr capsule Take 150 mg by mouth daily.       DISCHARGE DIAGNOSIS:    ICD-10-CM    1. S/P total knee arthroplasty, left Z96.652    2. OA  (osteoarthritis) of knee M17.10    3. Essential hypertension I10    4. Primary biliary cholangitis K74.3    5. GERD (gastroesophageal reflux disease) K21.9          MEDICAL EQUIPMENT NEEDS:  none    DISCHARGE PLAN  Discharge home, follow-up with surgeon as planned  Suggest she try to wean off her oxycodone  The patient is, or has been, under my care and I have initiated the establishment of the plan of care. This patient will be followed by a physician who will periodically review the plan of care.      Electronically signed by: Michelle Roldan MD

## 2021-06-14 NOTE — PROGRESS NOTES
[facility]      Facility:    [facility] [[id number]]    Code Status: FULL CODE   [facility] 11/28 - 12/2/17      Chief Complaint/Reason for Visit:  Chief Complaint   Patient presents with     H & P     L TKA       HPI:   Nohemy is a 71 y.o. female who had a long-standing history of left knee pain.  The pain was progressive and becoming disabling.  X-rays revealed advanced osteoarthritis with loss of joint space, osteophyte formation, valgus alignment.  Conservative therapies were not sufficient.  She had a total knee arthroplasty on November 28, 2017.  She did well postoperatively, had 24 hours of antibiotics.  She was started on the CPM machine on postop day #1, her postoperative hemoglobin was 9.7, DVT prophylaxis with aspirin was initiated on the date of surgery.    She discharged to [facility] transitional care, with weightbearing as tolerated, plans to do occupational and physical therapy.    Past Medical History:  Past Medical History:   Diagnosis Date     Basal cell carcinoma      Biliary cirrhosis      Depression      Endometriosis      GERD (gastroesophageal reflux disease)      HTN (hypertension)      Insomnia      Osteoarthritis of knee      Ovarian cancer      Primary biliary cholangitis      RLS (restless legs syndrome)            Surgical History:  Past Surgical History:   Procedure Laterality Date     APPENDECTOMY       BASAL CELL CARCINOMA EXCISION       CHOLECYSTECTOMY  2004     HYSTERECTOMY  1985     ORIF ANKLE FRACTURE Left 1980     OTHER SURGICAL HISTORY Bilateral     thumb joint surgery: right 2008, left 2011     OTHER SURGICAL HISTORY  02/09/2017    3rd Char [place]ola Touch      OVARY SURGERY  2004, 2009    lap debulking surgery      TONSILLECTOMY AND ADENOIDECTOMY       TOTAL KNEE ARTHROPLASTY Right 05/02/2017     TOTAL KNEE ARTHROPLASTY Left 11/28/2017       Family History:   No family history on file.    Social History:    Social History     Social  History     Marital status: Single     Spouse name: N/A     Number of children: N/A     Years of education: [employer]     Social History Main Topics     Smoking status: Never Smoker     Smokeless tobacco: Never Used     Alcohol use No     Drug use: No     Sexual activity: Not on file     Other Topics Concern      4 years, Masters Degree, Divinity Degree     Social History Narrative   Moved from [place] should be closer to her aunt and uncle, school friends.  Lives alone in an apartment, is on the waiting list at [facility] for an independent apartment       Review of Systems   She is upset with staff care being insufficiently attentive  Her pain is 5 out of 10, although pain medications to take the edge off her pain.  Ice packs are also helpful   she had no bowel movement in the hospital after surgery  She has radiation proctitis and takes MiraLAX daily at home  She previously had a right total knee arthroplasty in May 2017, likes the physical therapist department  Does not enjoy the food at [employer] [facility]    Vitals:    12/04/17 0900   BP: 138/77   Pulse: 64   Resp: 19   Temp: 98.2  F (36.8  C)   SpO2: 96%       Physical Exam   Constitutional: She is oriented to person, place, and time. She appears well-nourished. No distress.   HENT:   Right Ear: External ear normal.   Left Ear: External ear normal.   Nose: Nose normal.   Mouth/Throat: Oropharynx is clear and moist.   Eyes: Conjunctivae and EOM are normal. No scleral icterus.   Cardiovascular: Regular rhythm.    Murmur heard.  Pulmonary/Chest: Breath sounds normal. She has no wheezes. She has no rales.   Abdominal: Soft. Bowel sounds are normal.   Musculoskeletal: She exhibits edema and tenderness.   Right knee wound dry, diffuse tissue swelling, minimal erythema just along the incision line   Lymphadenopathy:     She has no cervical adenopathy.   Neurological: She is alert and oriented to person, place, and time. Coordination  normal.   Skin: Skin is warm and dry.   Psychiatric: She has a normal mood and affect. Her behavior is normal. Judgment and thought content normal.          ALLERGIES:  Ciprofloxacin  Adhesive tapes    Medication List:  Current Outpatient Prescriptions   Medication Sig     acetaminophen (TYLENOL) 500 MG tablet Take 1,000 mg by mouth 3 (three) times a day.     aspirin 81 MG EC tablet Take 162 mg by mouth daily.      atorvastatin (LIPITOR) 10 MG tablet Take 10 mg by mouth at bedtime.     b complex vitamins capsule Take 1 capsule by mouth every morning.     bisacodyl (DULCOLAX) 10 mg suppository Insert 10 mg into the rectum every other day as needed.     cholecalciferol, vitamin D3, (VITAMIN D3) 2,000 unit Tab Take 1 tablet by mouth every morning.     diclofenac sodium (VOLTAREN) 1 % Gel Apply 1 application topically 2 (two) times a day as needed.     docusate sodium (COLACE) 100 MG capsule Take 100 mg by mouth 2 (two) times a day.     ferrous sulfate 325 (65 FE) MG tablet Take 1 tablet by mouth daily with breakfast.     gabapentin (NEURONTIN) 300 MG capsule Take 300 mg by mouth daily.     losartan (COZAAR) 100 MG tablet Take 100 mg by mouth at bedtime.     magnesium hydroxide (MILK OF MAG) 400 mg/5 mL Susp suspension Take 30 mL by mouth 2 (two) times a day as needed.     mesalamine (CANASA) 1000 MG suppository Insert 1,000 mg into the rectum daily as needed.     omeprazole (PRILOSEC) 20 MG capsule Take 20 mg by mouth every morning.     ondansetron (ZOFRAN) 4 MG tablet Take 4 mg by mouth every 6 (six) hours as needed for nausea.     oxyCODONE (ROXICODONE) 5 MG immediate release tablet Take 10 mg by mouth every 4 (four) hours as needed for pain.      polyethylene glycol (MIRALAX) 17 gram packet Take 17 g by mouth daily.      tretinoin (RETIN-A) 0.01 % gel Apply 1 application topically daily.     ursodiol (ACTIGALL) 300 mg capsule Take 600 mg by mouth 2 (two) times a day.     venlafaxine (EFFEXOR-XR) 150 MG 24 hr capsule  Take 150 mg by mouth daily.       Labs:      Assessment:    ICD-10-CM    1. S/P total knee arthroplasty, left Z96.652    2. Post-op pain G89.18    3. OA (osteoarthritis) of knee M17.10    4. Essential hypertension I10    5. GERD (gastroesophageal reflux disease) K21.9    6. Primary biliary cholangitis K74.3    7. Depression F32.9    8. H/O ovarian cancer Z85.43          Plan:  Feels she needs oxycodone around-the-clock because of the significant pain, and delaying receiving as needed medications.  For now we will do oxycodone 10 mg every 4 hours scheduled around the clock, including getting up at night.  Later in the week we can reassess and see if we can start cutting back on the frequency  Continue therapies      Electronically signed by: Michelle Roldan MD

## 2021-10-09 ENCOUNTER — HEALTH MAINTENANCE LETTER (OUTPATIENT)
Age: 76
End: 2021-10-09

## 2022-05-16 ENCOUNTER — HEALTH MAINTENANCE LETTER (OUTPATIENT)
Age: 77
End: 2022-05-16

## 2022-09-11 ENCOUNTER — HEALTH MAINTENANCE LETTER (OUTPATIENT)
Age: 77
End: 2022-09-11

## 2022-12-05 ENCOUNTER — ONCOLOGY VISIT (OUTPATIENT)
Dept: ONCOLOGY | Facility: CLINIC | Age: 77
End: 2022-12-05
Attending: NURSE PRACTITIONER
Payer: COMMERCIAL

## 2022-12-05 VITALS
RESPIRATION RATE: 16 BRPM | TEMPERATURE: 98.4 F | SYSTOLIC BLOOD PRESSURE: 147 MMHG | DIASTOLIC BLOOD PRESSURE: 80 MMHG | OXYGEN SATURATION: 98 % | BODY MASS INDEX: 32.2 KG/M2 | WEIGHT: 196.5 LBS | HEART RATE: 70 BPM

## 2022-12-05 DIAGNOSIS — I15.9 SECONDARY HYPERTENSION: ICD-10-CM

## 2022-12-05 DIAGNOSIS — Z85.43 H/O OVARIAN CANCER: Primary | ICD-10-CM

## 2022-12-05 LAB — CANCER AG125 SERPL-ACNC: 15 U/ML

## 2022-12-05 PROCEDURE — 36415 COLL VENOUS BLD VENIPUNCTURE: CPT | Performed by: NURSE PRACTITIONER

## 2022-12-05 PROCEDURE — G0463 HOSPITAL OUTPT CLINIC VISIT: HCPCS

## 2022-12-05 PROCEDURE — 86304 IMMUNOASSAY TUMOR CA 125: CPT | Performed by: NURSE PRACTITIONER

## 2022-12-05 PROCEDURE — 99215 OFFICE O/P EST HI 40 MIN: CPT | Performed by: NURSE PRACTITIONER

## 2022-12-05 RX ORDER — IBUPROFEN 200 MG
200 TABLET ORAL EVERY 4 HOURS PRN
COMMUNITY

## 2022-12-05 RX ORDER — MESALAMINE 1000 MG/1
1000 SUPPOSITORY RECTAL 2 TIMES DAILY
COMMUNITY

## 2022-12-05 RX ORDER — BIMATOPROST 0.1 MG/ML
SOLUTION/ DROPS OPHTHALMIC
COMMUNITY
Start: 2022-06-04

## 2022-12-05 ASSESSMENT — PAIN SCALES - GENERAL: PAINLEVEL: NO PAIN (0)

## 2022-12-05 NOTE — LETTER
Date:December 6, 2022      Patient was self referred, no letter generated. Do not send.        [facility] Information

## 2022-12-05 NOTE — NURSING NOTE
Patient labs drawn in clinic via venipuncture. Patient tolerated well and was discharged. Specimen(s) sent to first floor lab for processing. See flowsheet for details.      Violeta Pacheco CMA on 12/5/2022 at 2:56 PM

## 2022-12-05 NOTE — PROGRESS NOTES
"            Follow Up Notes on Referred Patient    Date: 2022      RE: Nohemy Gurrola  : 1945  LORENA: 2022    Nohemy Gurrola is a 76 year old woman with a history of recurrent stage IIIc ovarian cancer; treated in  and . She is here for follow up. She was last seen in clinic 2019.     Oncology History:     Ms Gurrola had an open TLH-BSO in  due to endometriosis and was on premarin for 19 years until  when she was taken off. She was originally diagnosed with stage IIIc ovarian cancer in  - timeline below. She takes a baby aspirin daily. No fx of ovarian or breast cancer. No hx of diabetes, heart or lung disease. She is an only child and does not have any children. She has taken miralax for radiation proctitis.       - dianosed with stage IIIc ovarian cancer  - presented with blood clot, CT scan revealed right sided pelvic mass measuring 7 x 6 cm with obstruction of ureter.   - CEA    - November: removal of pelvic mass, 2/5 lymph nodes revealed metastatic adenocarcinoma, no lymphvascular space invasion   - treated surgically and with chemo (carbo/taxol) with Dr. More in [place].   - initial   1348     [id number]: disease free     2009:  increased to 18.2     2009: interval increase in size of mass. Dr. More was worried that disease had recurred.      2009: diagnostic laparoscopy revealed extensive adhesions, underwent debulking and additional carbo/taxol plus radiation to pelvis      09: MRI pelvis Impression:  1. Right sided 3.3 cm pelvic mass worrisome for recurrent disease  2. Solitary prominent 13mm right common iliac artery lymph node  09: right pelvic sidewall mass - biopsy revealed adipose tissue with metastatic adenocarcinoma consistent with given clinical history of patient's carcinoma of ovary.      Summer 2016: had severe UTI and e. Coli infection. CT scan done in [place] which revealed \"small spot\" in pelvis, not " representative of recurrent disease.  was 10.6 at that time.     December 2016: Moved back to [place]     [facility] which is higher than usual for her     3/7/2017: CT CAP Impression:  1. No evidence of residual/recurrent disease in chest, abdomen and pelvis.   2. 1.3 cm nodule left lobe of thyroid which may be evaluated by US thyroid if not done in the past.      4/11/17: pathology consult   FINAL DIAGNOSIS:   CASE FROM [facility], [place], [place] ([id number], NEED TO VERIFY DATE OBTAINED):     SOFT TISSUE, RIGHT PELVIC SIDEWALL MASS, BIOPSY:   - Adenocarcinoma (see comment)   - One reactive lymph node negative for carcinoma (0/1)   COMMENT:   Tissue sections show adenocarcinoma that is morphologically compatible with endometrioid morphology.  The adenocarcinoma involves mesothelial-lined fibroadipose tissue and a portion of an uninvolved lymph node is also present.  No lymph-vascular invasion is identified.     5/24/18:   12.   5/21/19:  15.  6/5/2020:  14.  11/16/22:  17 ([facility])          Today she comes into clinic with concern over her  results [facility] (17). She is unsure if she had a  done last year or not. She denies any s/sx Denies vaginal bleeding. Denies abdominal tenderness or bloating. Denies early satiety. Not currently sexually active. Denies any problems with her bowel/bladder. Her last pelvic exam was here in 2019. She is up to date with her mammogram (10/22), colonoscopy (3/14) and reports she is up to date on her Dexa scan, however, does not know exact date. Most likely completed in 2020. She had her annual on 11/16/22. She is not currently monitoring her home BPs. She has been active, and recently travelled to the [place] and did a cruise by herself. She states she did have genetic testing done since she was here last.       Review of Systems:    Systemic           no weight changes; no fever; no chills; no night sweats; no  appetite changes  Skin           no rashes, or lesions  Eye           no irritation; no changes in vision  Lara-Laryngeal           no dysphagia; no hoarseness   Pulmonary    no cough; no shortness of breath  Cardiovascular    no chest pain; no palpitations' + HTN  Gastrointestinal    no diarrhea; no constipation; no abdominal pain; no changes in bowel habits; no blood in stool  Genitourinary   no urinary frequency; no urinary urgency; no dysuria; no pain; no abnormal vaginal discharge; no abnormal vaginal bleeding  Breast    no breast discharge; no breast changes; no breast pain  Musculoskeletal    no myalgias; no arthralgias; no back pain  Psychiatric           no depressed mood; no anxiety    Hematologic              no tender lymph nodes; no noticeable swellings or lumps   Endocrine    no hot flashes; no heat/cold intolerance         Neurological   no tremor; no numbness and tingling; no headaches; no difficulty sleeping      Past Medical History:    Past Medical History:   Diagnosis Date     DVT (deep vein thrombosis) in pregnancy 2004    left saphenous vein     Endometriosis      Heartburn      Hemorrhoids      Hyperlipidemia      Hypertension      Insomnia      Major depression      Ovarian cancer (H) 11/2004 and recurred 7/2009     Primary biliary cholangitis (H)          Past Surgical History:    Past Surgical History:   Procedure Laterality Date     APPENDECTOMY       AS TOTAL ABDOM HYSTERECTOMY  1985    for endometriosis and fibroids     BASAL CELL CARCINOMA EXCISION       CHOLECYSTECTOMY  2004     CHOLECYSTECTOMY  2004     HYSTERECTOMY  1985     LAPAROSCOPY  2009    for recurrent ovarian cancer     LAPAROTOMY EXPLORATORY  2004    for ovarian cancer     OPEN REDUCTION INTERNAL FIXATION ANKLE Left 1980     OPEN REDUCTION INTERNAL FIXATION ANKLE Left 1980     OTHER SURGICAL HISTORY Bilateral     OTHER SURGICAL HISTORYthumb joint surgery: right 2008, left 2011     OTHER SURGICAL HISTORY  02/09/2017    OTHER  SURGICAL [id number] Char [place] Touch      OVARY SURGERY  2004    lap debulking surgery      SALPINGO-OOPHORECTOMY BILATERAL Bilateral 1985     TONSILLECTOMY & ADENOIDECTOMY       TOTAL KNEE ARTHROPLASTY Right 05/02/2017     TOTAL KNEE ARTHROPLASTY Left 11/28/2017         Health Maintenance Due   Topic Date Due     DEXA  Never done     ADVANCE CARE PLANNING  Never done     DEPRESSION ACTION PLAN  Never done     PHQ-9  Never done     HEPATITIS C SCREENING  Never done     LIPID  Never done     MEDICARE ANNUAL WELLNESS VISIT  Never done     FALL RISK ASSESSMENT  03/16/2018     COVID-19 Vaccine (5 - Booster for Pfizer series) 07/19/2022     INFLUENZA VACCINE (1) 09/01/2022       Current Medications:     Current Outpatient Medications   Medication Sig Dispense Refill     acetaminophen (TYLENOL) 500 MG tablet Take 1,000 mg by mouth       ASPIRIN PO Take 81 mg by mouth daily       ATORVASTATIN CALCIUM PO Take 10 mg by mouth        B Complex Vitamins (VITAMIN B COMPLEX PO) Take 1 capsule by mouth       Cholecalciferol (VITAMIN D3 PO) Take by mouth daily       LOSARTAN POTASSIUM PO Take 100 mg by mouth        MAGNESIUM PO        multivitamin, therapeutic (THERA-VIT) TABS Take 1 tablet by mouth daily Reported on 3/30/2017       omeprazole (PRILOSEC) 20 MG CR capsule Take 20 mg by mouth daily        polyethylene glycol (MIRALAX/GLYCOLAX) powder TK 17 GRAMS DISSOLVED IN 6 OUNCES WATER ONCE D PRF CONSTIPATION  11     URSODIOL PO Take by mouth 2 times daily       Venlafaxine HCl (EFFEXOR PO) Take 150 mg by mouth daily            Allergies:        Allergies   Allergen Reactions     Ciprofloxacin Other (See Comments)     Pain in legs and feet     Adhesive Tape      Perfume         Social History:     Social History     Tobacco Use     Smoking status: Never     Smokeless tobacco: Never   Substance Use Topics     Alcohol use: No     Alcohol/week: 0.0 standard drinks       History   Drug Use No         Family History:     The patient's  family history is notable for:    Family History   Problem Relation Age of Onset     Bipolar Disorder Mother      Suicide Mother          Physical Exam:     BP (!) 147/80 (BP Location: Right arm, Patient Position: Sitting, Cuff Size: Adult Large)   Pulse 70   Temp 98.4  F (36.9  C) (Oral)   Resp 16   Wt 89.1 kg (196 lb 8 oz)   SpO2 98%   BMI 32.20 kg/m    Body mass index is 32.2 kg/m .    General Appearance: healthy and alert, no distress     HEENT: no thyromegaly, no palpable nodules or masses        Cardiovascular: regular rate and rhythm, no gallops, rubs or murmurs     Respiratory: lungs clear, no rales, rhonchi or wheezes, normal diaphragmatic excursion    Musculoskeletal: extremities non tender and without edema    Skin: no lesions or rashes, see GI    Neurological: normal gait, no gross defects     Psychiatric: appropriate mood and affect                               Hematological: normal cervical, supraclavicular and inguinal lymph nodes     Gastrointestinal:       abdomen soft, non-tender, non-distended, no organomegaly or masses, prominent horizontal scar from appendectomy when she was a child    Genitourinary: External genitalia and urethral meatus appears normal.  Vagina is smooth without nodularity or masses.  Cervix surgically absent  Bimanual exam reveal no masses, nodularity or fullness.  Recto-vaginal exam confirms these findings.      Assessment:    Nohemy Gurrola is a 76 year old woman with a history of recurrent stage IIIc ovarian cancer; treated in 2004 and 2009. She is here for follow up. She was last seen in clinic 5/2019.    40 minutes spent on the date of the encounter doing chart review, history and exam, documentation and further activities as noted above      Plan:     1.)        Will monitor her  and follow-up based on results; if her  remains stable in mid-upper teens, will plan to repeat in 3-6 months; if  is >20 will repeat in 4-6 weeks. Discussed the new lab  here has a normal range of <38 so her prior results from this system of 14 and 15 may actually be about 4 points higher. Discussed she will need to have a pelvic/rectal exam annually; [facility]. Reviewed signs and symptoms for when she should contact the clinic or seek additional care. Patient to contact the clinic with any questions or concerns in the interim.  Answered all of her questions to the best of my ability.     2.) Genetic risk factors were assessed and per patient [facility] which [facility].     3.) Labs and/or tests ordered include: .      4.) Health maintenance issues addressed today include annual health maintenance and non-gynecologic issues with PCP.    5.)        HTN: encouraged to get a BP cuff to monitor at home. Follow up with her PCP if her readings remain >140/90. She verbalized understanding.     TIMBO Howell, WHNP-BC, ANP-BC  Women's Health Nurse Practitioner  Adult Nurse Practitioner  Division of Gynecologic Oncology    I, Renita Damon, completed the PFSH and ROS. I then acted as a scribe for Winifred Pillai for the remainder of the visit.  Renita Damon BSN, RN  [facility] DNP, EDNP student    CC  Patient Care Team:  Outside, Provider as PCP - General

## 2022-12-05 NOTE — NURSING NOTE
"Oncology Rooming Note    December 5, 2022 2:13 PM   Nohemy Gurrola is a 76 year old female who presents for:    Chief Complaint   Patient presents with     Oncology Clinic Visit     H/O ovarian cancer (Primary Dx)      Initial Vitals: BP (!) 147/80 (BP Location: Right arm, Patient Position: Sitting, Cuff Size: Adult Large)   Pulse 70   Temp 98.4  F (36.9  C) (Oral)   Resp 16   Wt 89.1 kg (196 lb 8 oz)   SpO2 98%   BMI 32.20 kg/m   Estimated body mass index is 32.2 kg/m  as calculated from the following:    Height as of 5/21/19: 1.664 m (5' 5.5\").    Weight as of this encounter: 89.1 kg (196 lb 8 oz). Body surface area is 2.03 meters squared.  No Pain (0) Comment: Data Unavailable   No LMP recorded. Patient has had a hysterectomy.  Allergies reviewed: Yes  Medications reviewed: Yes    Medications: Medication refills not needed today.  Pharmacy name entered into [employer]:    [id number] - [place], [place] - [address] AT [facility] [address]  [facility] - [place], [place] - [address]    Clinical concerns:  None       Devaughn Holloway            "

## 2022-12-05 NOTE — LETTER
2022         RE: Nohemy Gurrola  [address]  [address]  [place] [address]        Dear Colleague,    Thank you for referring your patient, Nohemy Gurrola, to the [facility]. Please see a copy of my visit note below.                Follow Up Notes on Referred Patient    Date: 2022      RE: Nohemy Gurrola  : 1945  LORENA: 2022    Nohemy Gurrola is a 76 year old woman with a history of recurrent stage IIIc ovarian cancer; treated in  and . She is here for follow up. She was last seen in clinic 2019.     Oncology History:     Ms Gurrola had an open TLH-BSO in  due to endometriosis and was on premarin for 19 years until  when she was taken off. She was originally diagnosed with stage IIIc ovarian cancer in  - timeline below. She takes a baby aspirin daily. No fx of ovarian or breast cancer. No hx of diabetes, heart or lung disease. She is an only child and does not have any children. She has taken miralax for radiation proctitis.       - dianosed with stage IIIc ovarian cancer  - presented with blood clot, CT scan revealed right sided pelvic mass measuring 7 x 6 cm with obstruction of ureter.   - CEA    - November: removal of pelvic mass, 2/5 lymph nodes revealed metastatic adenocarcinoma, no lymphvascular space invasion   - treated surgically and with chemo (carbo/taxol) with Dr. More in [place].   - initial   1348     [id number]: disease free     2009:  increased to 18.2     2009: interval increase in size of mass. Dr. More was worried that disease had recurred.      2009: diagnostic laparoscopy revealed extensive adhesions, underwent debulking and additional carbo/taxol plus radiation to pelvis      09: MRI pelvis Impression:  1. Right sided 3.3 cm pelvic mass worrisome for recurrent disease  2. Solitary prominent 13mm right common iliac artery lymph node  09: right pelvic sidewall mass -  "biopsy revealed adipose tissue with metastatic adenocarcinoma consistent with given clinical history of patient's carcinoma of ovary.      Summer 2016: had severe UTI and e. Coli infection. CT scan done in [place] which revealed \"small spot\" in pelvis, not representative of recurrent disease.  was 10.6 at that time.     December 2016: Moved back to [place]     [facility] which is higher than usual for her     3/7/2017: CT CAP Impression:  1. No evidence of residual/recurrent disease in chest, abdomen and pelvis.   2. 1.3 cm nodule left lobe of thyroid which may be evaluated by US thyroid if not done in the past.      4/11/17: pathology consult   FINAL DIAGNOSIS:   CASE FROM [facility], [place], [place] ([id number], NEED TO VERIFY DATE OBTAINED):     SOFT TISSUE, RIGHT PELVIC SIDEWALL MASS, BIOPSY:   - Adenocarcinoma (see comment)   - One reactive lymph node negative for carcinoma (0/1)   COMMENT:   Tissue sections show adenocarcinoma that is morphologically compatible with endometrioid morphology.  The adenocarcinoma involves mesothelial-lined fibroadipose tissue and a portion of an uninvolved lymph node is also present.  No lymph-vascular invasion is identified.     5/24/18:   12.   5/21/19:  15.  6/5/2020:  14.  11/16/22:  [facility])          Today she comes into clinic with concern over her  [facility] ([facility]). She is unsure if she had a  done last year or not. She denies any s/sx Denies vaginal bleeding. Denies abdominal tenderness or bloating. Denies early satiety. Not currently sexually active. Denies any problems with her bowel/bladder. Her last pelvic exam was here in 2019. She is up to date with her mammogram (10/22), colonoscopy (3/14) and reports she is up to date on her Dexa scan, however, does not know exact date. Most likely completed in 2020. She had her annual on 11/16/22. She is not currently monitoring her home BPs. " She has been active, and recently travelled to the [place] and did a cruise by herself. She states she did have genetic testing done since she was here last.       Review of Systems:    Systemic           no weight changes; no fever; no chills; no night sweats; no appetite changes  Skin           no rashes, or lesions  Eye           no irritation; no changes in vision  Lara-Laryngeal           no dysphagia; no hoarseness   Pulmonary    no cough; no shortness of breath  Cardiovascular    no chest pain; no palpitations' + HTN  Gastrointestinal    no diarrhea; no constipation; no abdominal pain; no changes in bowel habits; no blood in stool  Genitourinary   no urinary frequency; no urinary urgency; no dysuria; no pain; no abnormal vaginal discharge; no abnormal vaginal bleeding  Breast    no breast discharge; no breast changes; no breast pain  Musculoskeletal    no myalgias; no arthralgias; no back pain  Psychiatric           no depressed mood; no anxiety    Hematologic              no tender lymph nodes; no noticeable swellings or lumps   Endocrine    no hot flashes; no heat/cold intolerance         Neurological   no tremor; no numbness and tingling; no headaches; no difficulty sleeping      Past Medical History:    Past Medical History:   Diagnosis Date     DVT (deep vein thrombosis) in pregnancy 2004    left saphenous vein     Endometriosis      Heartburn      Hemorrhoids      Hyperlipidemia      Hypertension      Insomnia      Major depression      Ovarian cancer (H) 11/2004 and recurred 7/2009     Primary biliary cholangitis (H)          Past Surgical History:    Past Surgical History:   Procedure Laterality Date     APPENDECTOMY       AS TOTAL ABDOM HYSTERECTOMY  1985    for endometriosis and fibroids     BASAL CELL CARCINOMA EXCISION       CHOLECYSTECTOMY  2004     CHOLECYSTECTOMY  2004     HYSTERECTOMY  1985     LAPAROSCOPY  2009    for recurrent ovarian cancer     LAPAROTOMY EXPLORATORY  2004    for ovarian cancer      OPEN REDUCTION INTERNAL FIXATION ANKLE Left 1980     OPEN REDUCTION INTERNAL FIXATION ANKLE Left 1980     OTHER SURGICAL HISTORY Bilateral     OTHER SURGICAL HISTORYthumb joint surgery: right 2008, left 2011     OTHER SURGICAL HISTORY  02/09/2017    OTHER SURGICAL [id number] Char Abiola Touch      OVARY SURGERY  2004    lap debulking surgery      SALPINGO-OOPHORECTOMY BILATERAL Bilateral 1985     TONSILLECTOMY & ADENOIDECTOMY       TOTAL KNEE ARTHROPLASTY Right 05/02/2017     TOTAL KNEE ARTHROPLASTY Left 11/28/2017         Health Maintenance Due   Topic Date Due     DEXA  Never done     ADVANCE CARE PLANNING  Never done     DEPRESSION ACTION PLAN  Never done     PHQ-9  Never done     HEPATITIS C SCREENING  Never done     LIPID  Never done     MEDICARE ANNUAL WELLNESS VISIT  Never done     FALL RISK ASSESSMENT  03/16/2018     COVID-19 Vaccine (5 - Booster for Pfizer series) 07/19/2022     INFLUENZA VACCINE (1) 09/01/2022       Current Medications:     Current Outpatient Medications   Medication Sig Dispense Refill     acetaminophen (TYLENOL) 500 MG tablet Take 1,000 mg by mouth       ASPIRIN PO Take 81 mg by mouth daily       ATORVASTATIN CALCIUM PO Take 10 mg by mouth        B Complex Vitamins (VITAMIN B COMPLEX PO) Take 1 capsule by mouth       Cholecalciferol (VITAMIN D3 PO) Take by mouth daily       LOSARTAN POTASSIUM PO Take 100 mg by mouth        MAGNESIUM PO        multivitamin, therapeutic (THERA-VIT) TABS Take 1 tablet by mouth daily Reported on 3/30/2017       omeprazole (PRILOSEC) 20 MG CR capsule Take 20 mg by mouth daily        polyethylene glycol (MIRALAX/GLYCOLAX) powder TK 17 GRAMS DISSOLVED IN 6 OUNCES WATER ONCE D PRF CONSTIPATION  11     URSODIOL PO Take by mouth 2 times daily       Venlafaxine HCl (EFFEXOR PO) Take 150 mg by mouth daily            Allergies:        Allergies   Allergen Reactions     Ciprofloxacin Other (See Comments)     Pain in legs and feet     Adhesive Tape      Perfume          Social History:     Social History     Tobacco Use     Smoking status: Never     Smokeless tobacco: Never   Substance Use Topics     Alcohol use: No     Alcohol/week: 0.0 standard drinks       History   Drug Use No         Family History:     The patient's family history is notable for:    Family History   Problem Relation Age of Onset     Bipolar Disorder Mother      Suicide Mother          Physical Exam:     BP (!) 147/80 (BP Location: Right arm, Patient Position: Sitting, Cuff Size: Adult Large)   Pulse 70   Temp 98.4  F (36.9  C) (Oral)   Resp 16   Wt 89.1 kg (196 lb 8 oz)   SpO2 98%   BMI 32.20 kg/m    Body mass index is 32.2 kg/m .    General Appearance: healthy and alert, no distress     HEENT: no thyromegaly, no palpable nodules or masses        Cardiovascular: regular rate and rhythm, no gallops, rubs or murmurs     Respiratory: lungs clear, no rales, rhonchi or wheezes, normal diaphragmatic excursion    Musculoskeletal: extremities non tender and without edema    Skin: no lesions or rashes, see GI    Neurological: normal gait, no gross defects     Psychiatric: appropriate mood and affect                               Hematological: normal cervical, supraclavicular and inguinal lymph nodes     Gastrointestinal:       abdomen soft, non-tender, non-distended, no organomegaly or masses, prominent horizontal scar from appendectomy when she was a child    Genitourinary: External genitalia and urethral meatus appears normal.  Vagina is smooth without nodularity or masses.  Cervix surgically absent  Bimanual exam reveal no masses, nodularity or fullness.  Recto-vaginal exam confirms these findings.      Assessment:    Nohemy Gurrola is a 76 year old woman with a history of recurrent stage IIIc ovarian cancer; treated in 2004 and 2009. She is here for follow up. She was last seen in clinic 5/2019.    40 minutes spent on the date of the encounter doing chart review, history and exam, documentation and  further activities as noted above      Plan:     1.)        Will monitor her  and follow-up based on results; if her  remains stable in mid-upper teens, will plan to repeat in 3-6 months; if  is >20 will repeat in 4-6 weeks. Discussed the new lab here has a normal range of <38 so her prior results from this system of 14 and 15 may actually be about 4 points higher. Discussed she will need to have a pelvic/rectal exam annually; this [facility]. Reviewed signs and symptoms for when she should contact the clinic or seek additional care. Patient to contact the clinic with any questions or concerns in the interim.  Answered all of her questions to the best of my ability.     2.) Genetic risk factors were assessed and per patient she [facility] which [facility].     3.) Labs and/or tests ordered include: .      4.) Health maintenance issues addressed today include annual health maintenance and non-gynecologic issues with PCP.    5.)        HTN: encouraged to get a BP cuff to monitor at home. Follow up with her PCP if her readings remain >140/90. She verbalized understanding.     TIMBO Howell, WHNP-BC, ANP-BC  Women's Health Nurse Practitioner  Adult Nurse Practitioner  Division of Gynecologic Oncology    I, Renita Damon, completed the PFSH and ROS. I then acted as a scribe for Winifred Pillai for the remainder of the visit.  Renita Damon BSN, RN  [facility] DNP, JONATHAN student    CC  Patient Care Team:  Outside, Provider as PCP - General        Again, thank you for allowing me to participate in the care of your patient.        Sincerely,        TIMBO Goddard CNP

## 2023-06-03 ENCOUNTER — HEALTH MAINTENANCE LETTER (OUTPATIENT)
Age: 78
End: 2023-06-03

## 2024-02-27 ENCOUNTER — TRANSCRIBE ORDERS (OUTPATIENT)
Dept: OTHER | Age: 79
End: 2024-02-27

## 2024-02-27 DIAGNOSIS — Z85.43 PERSONAL HISTORY OF MALIGNANT NEOPLASM OF OVARY: Primary | ICD-10-CM

## 2024-03-09 DIAGNOSIS — Z85.43 H/O OVARIAN CANCER: Primary | ICD-10-CM

## 2024-03-11 ENCOUNTER — LAB (OUTPATIENT)
Dept: LAB | Facility: CLINIC | Age: 79
End: 2024-03-11
Attending: NURSE PRACTITIONER
Payer: COMMERCIAL

## 2024-03-11 DIAGNOSIS — Z85.43 H/O OVARIAN CANCER: ICD-10-CM

## 2024-03-11 LAB — CANCER AG125 SERPL-ACNC: 18 U/ML

## 2024-03-11 PROCEDURE — 36415 COLL VENOUS BLD VENIPUNCTURE: CPT | Performed by: PATHOLOGY

## 2024-03-11 PROCEDURE — 99000 SPECIMEN HANDLING OFFICE-LAB: CPT | Performed by: PATHOLOGY

## 2024-03-11 PROCEDURE — 86304 IMMUNOASSAY TUMOR CA 125: CPT | Performed by: NURSE PRACTITIONER

## 2024-03-15 ENCOUNTER — ONCOLOGY VISIT (OUTPATIENT)
Dept: ONCOLOGY | Facility: CLINIC | Age: 79
End: 2024-03-15
Attending: NURSE PRACTITIONER
Payer: COMMERCIAL

## 2024-03-15 VITALS
TEMPERATURE: 98.7 F | BODY MASS INDEX: 32.96 KG/M2 | HEIGHT: 67 IN | RESPIRATION RATE: 16 BRPM | OXYGEN SATURATION: 98 % | DIASTOLIC BLOOD PRESSURE: 81 MMHG | SYSTOLIC BLOOD PRESSURE: 135 MMHG | WEIGHT: 210 LBS | HEART RATE: 77 BPM

## 2024-03-15 DIAGNOSIS — Z85.43 H/O OVARIAN CANCER: Primary | ICD-10-CM

## 2024-03-15 PROCEDURE — 99215 OFFICE O/P EST HI 40 MIN: CPT | Performed by: NURSE PRACTITIONER

## 2024-03-15 PROCEDURE — G0463 HOSPITAL OUTPT CLINIC VISIT: HCPCS | Performed by: NURSE PRACTITIONER

## 2024-03-15 ASSESSMENT — PAIN SCALES - GENERAL: PAINLEVEL: NO PAIN (0)

## 2024-03-15 NOTE — LETTER
3/15/2024         RE: Nohemy Gurrola  [address]  [address]  [place] [address]        Dear Colleague,    Thank you for referring your patient, Nohemy Gurrola, to the [facility]. Please see a copy of my visit note below.                Follow Up Notes on Referred Patient    Date: 3/15/2024        RE: Nohemy Gurrola  : 1945  LORENA: 3/15/2024    Nohemy Gurrola is a 78 year old woman with a history of recurrent stage IIIc ovarian cancer; treated in  and . She is here for follow up. She was last seen in clinic 2022.     Oncology History:     Ms Gurrola had an open TLH-BSO in  due to endometriosis and was on premarin for 19 years until  when she was taken off. She was originally diagnosed with stage IIIc ovarian cancer in  - timeline below. She takes a baby aspirin daily. No fx of ovarian or breast cancer. No hx of diabetes, heart or lung disease. She is an only child and does not have any children. She has taken miralax for radiation proctitis.       - dianosed with stage IIIc ovarian cancer  - presented with blood clot, CT scan revealed right sided pelvic mass measuring 7 x 6 cm with obstruction of ureter.   - CEA    - November: removal of pelvic mass, 2/5 lymph nodes revealed metastatic adenocarcinoma, no lymphvascular space invasion   - treated surgically and with chemo (carbo/taxol) with Dr. More in [place].   - initial   1348     [id number]: disease free     2009:  increased to 18.2     2009: interval increase in size of mass. Dr. More was worried that disease had recurred.      2009: diagnostic laparoscopy revealed extensive adhesions, underwent debulking and additional carbo/taxol plus radiation to pelvis      09: MRI pelvis Impression:  1. Right sided 3.3 cm pelvic mass worrisome for recurrent disease  2. Solitary prominent 13mm right common iliac artery lymph node  09: right pelvic sidewall  "mass - biopsy revealed adipose tissue with metastatic adenocarcinoma consistent with given clinical history of patient's carcinoma of ovary.      Summer 2016: had severe UTI and e. Coli infection. CT scan done in [place] which revealed \"small spot\" in pelvis, not representative of recurrent disease.  was 10.6 at that time.     December 2016: Moved back to [place]     [facility] 26 which is higher than usual for her     3/7/2017: CT CAP Impression:  1. No evidence of residual/recurrent disease in chest, abdomen and pelvis.   2. 1.3 cm nodule left lobe of thyroid which may be evaluated by US thyroid if not done in the past.      4/11/17: pathology consult   FINAL DIAGNOSIS:   CASE FROM [facility], [place], [place] ([id number], NEED TO VERIFY DATE OBTAINED):     SOFT TISSUE, RIGHT PELVIC SIDEWALL MASS, BIOPSY:   - Adenocarcinoma (see comment)   - One reactive lymph node negative for carcinoma (0/1)   COMMENT:   Tissue sections show adenocarcinoma that is morphologically compatible with endometrioid morphology.  The adenocarcinoma involves mesothelial-lined fibroadipose tissue and a portion of an uninvolved lymph node is also present.  No lymph-vascular invasion is identified.     5/24/18:  12.   5/21/19:  15.  6/5/2020:  14.  11/16/22:  17 ([facility])  12/5/22:  15   1/2024:  19 ([facility])    1/2024: CT ap ([facility] per patient [id number] (Unable to view)  2/1/24: MR Elastography. I personally reviewed  Impression    1.  1.7 x 1.4 cm lesion along the capsule of the inferior right hepatic lobe near the hepatorenal space demonstrates no abnormal enhancement and no restricted diffusion. These features combined with intermediate T1 and T2 signal suggests a complex cyst that may be either postinflammatory or posttraumatic. Due to the history of ovarian cancer, which can have a predilection for hepatic capsule distribution, recommend a follow-up study in 6 months to " confirm stability.  2.  Benign right hepatic lobe cyst measuring 9 mm.  3.  Benign cyst in the upper pole of the left kidney. No follow-up required.  4.  Diffuse biliary dilatation similar to 2016 at is likely in large part due to reservoir effect status post cholecystectomy.    3/11/24:  18.          Today she comes to clinic and states she had a cyst on her gluteal fold and had a CT done mid January which did not show any concerns for her ovarian cancer; she did have something on her liver and underwent an MR which showed a benign cyst. She denies any early satiety, no abdominal bloating/distention. She does report  having 8 UTIs last year and one in January. [facility]. She takes Miralax daily.       Annual exam with PCP:10/23  Mammogram: 10/23  Colonoscopy: 3/14; due       Review of Systems  See HPI      Past Medical History:    Past Medical History:   Diagnosis Date     DVT (deep vein thrombosis) in pregnancy 2004    left saphenous vein     Endometriosis      Heartburn      Hemorrhoids      Hyperlipidemia      Hypertension      Insomnia      Major depression      Ovarian cancer (H) 11/2004 and recurred 7/2009     Primary biliary cholangitis (H)          Past Surgical History:    Past Surgical History:   Procedure Laterality Date     APPENDECTOMY       AS TOTAL ABDOM HYSTERECTOMY  1985    for endometriosis and fibroids     BASAL CELL CARCINOMA EXCISION       CHOLECYSTECTOMY  2004     CHOLECYSTECTOMY  2004     HYSTERECTOMY  1985     LAPAROSCOPY  2009    for recurrent ovarian cancer     LAPAROTOMY EXPLORATORY  2004    for ovarian cancer     OPEN REDUCTION INTERNAL FIXATION ANKLE Left 1980     OPEN REDUCTION INTERNAL FIXATION ANKLE Left 1980     OTHER SURGICAL HISTORY Bilateral     OTHER SURGICAL HISTORYthumb joint surgery: right 2008, left 2011     OTHER SURGICAL HISTORY  02/09/2017    OTHER SURGICAL [id number] Char Abiola Touch      OVARY SURGERY  2004    lap debulking surgery       SALPINGO-OOPHORECTOMY BILATERAL Bilateral 1985     TONSILLECTOMY & ADENOIDECTOMY       TOTAL KNEE ARTHROPLASTY Right 05/02/2017     TOTAL KNEE ARTHROPLASTY Left 11/28/2017       Current Medications:     Current Outpatient Medications   Medication Sig Dispense Refill     acetaminophen (TYLENOL) 500 MG tablet Take 1,000 mg by mouth       ASPIRIN PO Take 81 mg by mouth daily       ATORVASTATIN CALCIUM PO Take 10 mg by mouth        B Complex Vitamins (VITAMIN B COMPLEX PO) Take 1 capsule by mouth       Cholecalciferol (VITAMIN D3 PO) Take by mouth daily       ibuprofen (ADVIL/MOTRIN) 200 MG tablet Take 200 mg by mouth every 4 hours as needed for pain       LOSARTAN POTASSIUM PO Take 100 mg by mouth        LUMIGAN 0.01 % SOLN ophthalmic solution INSTILL 1 DROP IN BOTH EYES AT BEDTIME       mesalamine (CANASA) 1000 MG suppository Place 1,000 mg rectally 2 times daily PRN       multivitamin, therapeutic (THERA-VIT) TABS Take 1 tablet by mouth daily Reported on 3/30/2017       omeprazole (PRILOSEC) 20 MG CR capsule Take 20 mg by mouth daily        polyethylene glycol (MIRALAX/GLYCOLAX) powder TK 17 GRAMS DISSOLVED IN 6 OUNCES WATER ONCE D PRF CONSTIPATION  11     URSODIOL PO Take by mouth 2 times daily       Venlafaxine HCl (EFFEXOR PO) Take 150 mg by mouth daily            Allergies:        Allergies   Allergen Reactions     Ciprofloxacin Other (See Comments)     Pain in legs and feet     Adhesive Tape      Perfume         Social History:     Social History     Tobacco Use     Smoking status: Never     Smokeless tobacco: Never   Substance Use Topics     Alcohol use: No     Alcohol/week: 0.0 standard drinks of alcohol       History   Drug Use No         Family History:     The patient's family history is notable for     Family History   Problem Relation Age of Onset     Bipolar Disorder Mother      Suicide Mother          Physical Exam:     /81 (BP Location: Right arm, Patient Position: Sitting, Cuff Size: Adult  "Regular)   Pulse 77   Temp 98.7  F (37.1  C) (Oral)   Resp 16   Ht 1.7 m (5' 6.93\")   Wt 95.3 kg (210 lb)   SpO2 98%   BMI 32.96 kg/m    Body mass index is 32.96 kg/m .    General Appearance: healthy and alert, no distress     HEENT: no thyromegaly, no palpable nodules or masses        Cardiovascular: regular rate and rhythm, no gallops, rubs or murmurs     Respiratory: lungs clear, no rales, rhonchi or wheezes, normal diaphragmatic excursion    Musculoskeletal: extremities non tender and without edema    Skin: no lesions or rashes     Neurological: normal gait, no gross defects     Psychiatric: appropriate mood and affect                               Hematological: normal cervical, supraclavicular and inguinal lymph nodes     Gastrointestinal:       abdomen soft, non-tender, non-distended, no organomegaly or masses    Genitourinary: External genitalia and urethral meatus appears normal.  Vagina is smooth without nodularity or masses.  Cervix surgically absetn.  Bimanual exam reveal no masses, nodularity or fullness.  Recto-vaginal exam confirms these findings.      Assessment:    Nohemy Gurrola is a 78 year old woman with a history of recurrent stage IIIc ovarian cancer; treated in 2004 and 2009. She is here for follow up. She was last seen in clinic 12/2022.    40 minutes spent on the date of the encounter doing chart review, history and exam, documentation and further activities as noted above      Plan:     1.)      Plan for a repeat  [facility]) and then she will return here in a year with a  done a couple days before the vitis (to be done at the ). Have requested to have her CT from January sent here for review. Reviewed signs and symptoms for when she should contact the clinic or seek additional care. Patient to contact the clinic with any questions or concerns in the interim.  Answered all of her questions to the best of my ability.     2.) Genetic risk factors were " assessed and she [facility] which was [facility] per patient.     3.) Labs and/or tests ordered include:  none..     4.) Health maintenance issues addressed today include annual health maintenance and non-gynecologic issues with PCP.    TIMBO Howell, WHNP-BC, ANP-BC, AOCNP  Women's Health Nurse Practitioner  Adult Nurse Practitioner  Division of Gynecologic Oncology  .      CC  Patient Care Team:  Outside, Provider as PCP - General  Winifred Pillai APRN CNP as Assigned Cancer Care Provider  SELF, REFERRED      Again, thank you for allowing me to participate in the care of your patient.        Sincerely,        TIMBO Goddard CNP

## 2024-03-15 NOTE — PROGRESS NOTES
"            Follow Up Notes on Referred Patient    Date: 3/15/2024        RE: Nohemy Gurrola  : 1945  LORENA: 3/15/2024    Nohemy Gurrola is a 78 year old woman with a history of recurrent stage IIIc ovarian cancer; treated in  and . She is here for follow up. She was last seen in clinic 2022.     Oncology History:     Ms Gurrola had an open TLH-BSO in  due to endometriosis and was on premarin for 19 years until  when she was taken off. She was originally diagnosed with stage IIIc ovarian cancer in  - timeline below. She takes a baby aspirin daily. No fx of ovarian or breast cancer. No hx of diabetes, heart or lung disease. She is an only child and does not have any children. She has taken miralax for radiation proctitis.       - dianosed with stage IIIc ovarian cancer  - presented with blood clot, CT scan revealed right sided pelvic mass measuring 7 x 6 cm with obstruction of ureter.   - CEA    - November: removal of pelvic mass, 2/5 lymph nodes revealed metastatic adenocarcinoma, no lymphvascular space invasion   - treated surgically and with chemo (carbo/taxol) with Dr. More in [place].   - initial   1348     [id number]: disease free     2009:  increased to 18.2     2009: interval increase in size of mass. Dr. More was worried that disease had recurred.      2009: diagnostic laparoscopy revealed extensive adhesions, underwent debulking and additional carbo/taxol plus radiation to pelvis      09: MRI pelvis Impression:  1. Right sided 3.3 cm pelvic mass worrisome for recurrent disease  2. Solitary prominent 13mm right common iliac artery lymph node  09: right pelvic sidewall mass - biopsy revealed adipose tissue with metastatic adenocarcinoma consistent with given clinical history of patient's carcinoma of ovary.      Summer 2016: had severe UTI and e. Coli infection. CT scan done in [place] which revealed \"small spot\" in pelvis, not " representative of recurrent disease.  was 10.6 at that time.     December 2016: Moved back to [place]     [facility] which is higher than usual for her     3/7/2017: CT CAP Impression:  1. No evidence of residual/recurrent disease in chest, abdomen and pelvis.   2. 1.3 cm nodule left lobe of thyroid which may be evaluated by US thyroid if not done in the past.      4/11/17: pathology consult   FINAL DIAGNOSIS:   CASE FROM [facility], [place], [place] ([id number], NEED TO VERIFY DATE OBTAINED):     SOFT TISSUE, RIGHT PELVIC SIDEWALL MASS, BIOPSY:   - Adenocarcinoma (see comment)   - One reactive lymph node negative for carcinoma (0/1)   COMMENT:   Tissue sections show adenocarcinoma that is morphologically compatible with endometrioid morphology.  The adenocarcinoma involves mesothelial-lined fibroadipose tissue and a portion of an uninvolved lymph node is also present.  No lymph-vascular invasion is identified.     5/24/18:  12.   5/21/19:  15.  6/5/2020:  14.  11/16/22:  17 [facility]  12/5/22:  15   1/2024:  19 ([facility])    1/2024: CT ap ([facility] per patient TUYET (Unable to view)  2/1/24: MR Elastography. I personally reviewed  Impression    1.  1.7 x 1.4 cm lesion along the capsule of the inferior right hepatic lobe near the hepatorenal space demonstrates no abnormal enhancement and no restricted diffusion. These features combined with intermediate T1 and T2 signal suggests a complex cyst that may be either postinflammatory or posttraumatic. Due to the history of ovarian cancer, which can have a predilection for hepatic capsule distribution, recommend a follow-up study in 6 months to confirm stability.  2.  Benign right hepatic lobe cyst measuring 9 mm.  3.  Benign cyst in the upper pole of the left kidney. No follow-up required.  4.  Diffuse biliary dilatation similar to 2016 at is likely in large part due to reservoir effect status post  cholecystectomy.    3/11/24:  18.          Today she comes to clinic and states she had a cyst on her gluteal fold and had a CT done mid January which did not show any concerns for her ovarian cancer; she did have something on her liver and underwent an MR which showed a benign cyst. She denies any early satiety, no abdominal bloating/distention. She does report  having 8 UTIs last year and one in January. [facility]. She takes Miralax daily.       Annual exam with PCP:10/23  Mammogram: 10/23  Colonoscopy: 3/14; due       Review of Systems  See HPI      Past Medical History:    Past Medical History:   Diagnosis Date    DVT (deep vein thrombosis) in pregnancy 2004    left saphenous vein    Endometriosis     Heartburn     Hemorrhoids     Hyperlipidemia     Hypertension     Insomnia     Major depression     Ovarian cancer (H) 11/2004 and recurred 7/2009    Primary biliary cholangitis (H)          Past Surgical History:    Past Surgical History:   Procedure Laterality Date    APPENDECTOMY      AS TOTAL ABDOM HYSTERECTOMY  1985    for endometriosis and fibroids    BASAL CELL CARCINOMA EXCISION      CHOLECYSTECTOMY  2004    CHOLECYSTECTOMY  2004    HYSTERECTOMY  1985    LAPAROSCOPY  2009    for recurrent ovarian cancer    LAPAROTOMY EXPLORATORY  2004    for ovarian cancer    OPEN REDUCTION INTERNAL FIXATION ANKLE Left 1980    OPEN REDUCTION INTERNAL FIXATION ANKLE Left 1980    OTHER SURGICAL HISTORY Bilateral     OTHER SURGICAL HISTORYthumb joint surgery: right 2008, left 2011    OTHER SURGICAL HISTORY  02/09/2017    OTHER SURGICAL [id number] Char Abiola Touch     OVARY SURGERY  2004    lap debulking surgery     SALPINGO-OOPHORECTOMY BILATERAL Bilateral 1985    TONSILLECTOMY & ADENOIDECTOMY      TOTAL KNEE ARTHROPLASTY Right 05/02/2017    TOTAL KNEE ARTHROPLASTY Left 11/28/2017       Current Medications:     Current Outpatient Medications   Medication Sig Dispense Refill    acetaminophen  "(TYLENOL) 500 MG tablet Take 1,000 mg by mouth      ASPIRIN PO Take 81 mg by mouth daily      ATORVASTATIN CALCIUM PO Take 10 mg by mouth       B Complex Vitamins (VITAMIN B COMPLEX PO) Take 1 capsule by mouth      Cholecalciferol (VITAMIN D3 PO) Take by mouth daily      ibuprofen (ADVIL/MOTRIN) 200 MG tablet Take 200 mg by mouth every 4 hours as needed for pain      LOSARTAN POTASSIUM PO Take 100 mg by mouth       LUMIGAN 0.01 % SOLN ophthalmic solution INSTILL 1 DROP IN BOTH EYES AT BEDTIME      mesalamine (CANASA) 1000 MG suppository Place 1,000 mg rectally 2 times daily PRN      multivitamin, therapeutic (THERA-VIT) TABS Take 1 tablet by mouth daily Reported on 3/30/2017      omeprazole (PRILOSEC) 20 MG CR capsule Take 20 mg by mouth daily       polyethylene glycol (MIRALAX/GLYCOLAX) powder TK 17 GRAMS DISSOLVED IN 6 OUNCES WATER ONCE D PRF CONSTIPATION  11    URSODIOL PO Take by mouth 2 times daily      Venlafaxine HCl (EFFEXOR PO) Take 150 mg by mouth daily            Allergies:        Allergies   Allergen Reactions    Ciprofloxacin Other (See Comments)     Pain in legs and feet    Adhesive Tape     Perfume         Social History:     Social History     Tobacco Use    Smoking status: Never    Smokeless tobacco: Never   Substance Use Topics    Alcohol use: No     Alcohol/week: 0.0 standard drinks of alcohol       History   Drug Use No         Family History:     The patient's family history is notable for     Family History   Problem Relation Age of Onset    Bipolar Disorder Mother     Suicide Mother          Physical Exam:     /81 (BP Location: Right arm, Patient Position: Sitting, Cuff Size: Adult Regular)   Pulse 77   Temp 98.7  F (37.1  C) (Oral)   Resp 16   Ht 1.7 m (5' 6.93\")   Wt 95.3 kg (210 lb)   SpO2 98%   BMI 32.96 kg/m    Body mass index is 32.96 kg/m .    General Appearance: healthy and alert, no distress     HEENT: no thyromegaly, no palpable nodules or masses "        Cardiovascular: regular rate and rhythm, no gallops, rubs or murmurs     Respiratory: lungs clear, no rales, rhonchi or wheezes, normal diaphragmatic excursion    Musculoskeletal: extremities non tender and without edema    Skin: no lesions or rashes     Neurological: normal gait, no gross defects     Psychiatric: appropriate mood and affect                               Hematological: normal cervical, supraclavicular and inguinal lymph nodes     Gastrointestinal:       abdomen soft, non-tender, non-distended, no organomegaly or masses    Genitourinary: External genitalia and urethral meatus appears normal.  Vagina is smooth without nodularity or masses.  Cervix surgically absetn.  Bimanual exam reveal no masses, nodularity or fullness.  Recto-vaginal exam confirms these findings.      Assessment:    Nohemy Gurrola is a 78 year old woman with a history of recurrent stage IIIc ovarian cancer; treated in 2004 and 2009. She is here for follow up. She was last seen in clinic 12/2022.    40 minutes spent on the date of the encounter doing chart review, history and exam, documentation and further activities as noted above      Plan:     1.)      Plan for a repeat  in [facility]) and then she will return here in a year with a  done a couple days before the vitis (to be done at the ). Have requested to have her CT from January sent here for review. Reviewed signs and symptoms for when she should contact the clinic or seek additional care. Patient to contact the clinic with any questions or concerns in the interim.  Answered all of her questions to the best of my ability.     2.) Genetic risk factors were assessed and she [facility] which was negative per patient.     3.) Labs and/or tests ordered include:  none..     4.) Health maintenance issues addressed today include annual health maintenance and non-gynecologic issues with PCP.    TIMBO Howell, JONATHAN-BC,  ANP-BC, AOCNP  Women's Health Nurse Practitioner  Adult Nurse Practitioner  Division of Gynecologic Oncology  .      CC  Patient Care Team:  Outside, Provider as PCP - Winifred Ren APRN CNP as Assigned Cancer Care Provider  SELF, REFERRED

## 2024-03-15 NOTE — NURSING NOTE
"Oncology Rooming Note    March 15, 2024 2:49 PM   Nohemy Gurrola is a 78 year old female who presents for:    Chief Complaint   Patient presents with    Oncology Clinic Visit     Ovarian cancer; Rising      Initial Vitals: /81 (BP Location: Right arm, Patient Position: Sitting, Cuff Size: Adult Regular)   Pulse 77   Temp 98.7  F (37.1  C) (Oral)   Resp 16   Ht 1.7 m (5' 6.93\")   Wt 95.3 kg (210 lb)   SpO2 98%   BMI 32.96 kg/m   Estimated body mass index is 32.96 kg/m  as calculated from the following:    Height as of this encounter: 1.7 m (5' 6.93\").    Weight as of this encounter: 95.3 kg (210 lb). Body surface area is 2.12 meters squared.  No Pain (0) Comment: Data Unavailable   No LMP recorded. Patient has had a hysterectomy.  Allergies reviewed: Yes  Medications reviewed: Yes    Medications: Medication refills not needed today.  Pharmacy name entered into [email or web address]:    [id number] - [place], [place] - [address] AT [facility] [address]  [facility] - [place], [place] - [address]    Frailty Screening:   Is the patient here for a new oncology consult visit in cancer care? 2. No      Clinical concerns: none       Nati Ybarra              "

## 2024-07-13 ENCOUNTER — HEALTH MAINTENANCE LETTER (OUTPATIENT)
Age: 79
End: 2024-07-13

## 2025-03-03 ENCOUNTER — DOCUMENTATION ONLY (OUTPATIENT)
Dept: ONCOLOGY | Facility: CLINIC | Age: 80
End: 2025-03-03
Payer: COMMERCIAL

## 2025-03-06 ENCOUNTER — LAB (OUTPATIENT)
Dept: LAB | Facility: CLINIC | Age: 80
End: 2025-03-06
Payer: COMMERCIAL

## 2025-03-06 DIAGNOSIS — C56.9 MALIGNANT NEOPLASM OF OVARY, UNSPECIFIED LATERALITY (H): ICD-10-CM

## 2025-03-06 LAB — CANCER AG125 SERPL-ACNC: 16 U/ML

## 2025-03-06 PROCEDURE — 99000 SPECIMEN HANDLING OFFICE-LAB: CPT | Performed by: PATHOLOGY

## 2025-03-06 PROCEDURE — 36415 COLL VENOUS BLD VENIPUNCTURE: CPT | Performed by: PATHOLOGY

## 2025-03-06 PROCEDURE — 86304 IMMUNOASSAY TUMOR CA 125: CPT | Performed by: NURSE PRACTITIONER

## 2025-03-17 ENCOUNTER — ONCOLOGY VISIT (OUTPATIENT)
Dept: ONCOLOGY | Facility: CLINIC | Age: 80
End: 2025-03-17
Attending: NURSE PRACTITIONER
Payer: COMMERCIAL

## 2025-03-17 VITALS
HEART RATE: 73 BPM | WEIGHT: 198.3 LBS | TEMPERATURE: 98.5 F | SYSTOLIC BLOOD PRESSURE: 122 MMHG | DIASTOLIC BLOOD PRESSURE: 71 MMHG | BODY MASS INDEX: 31.12 KG/M2 | RESPIRATION RATE: 16 BRPM | OXYGEN SATURATION: 98 %

## 2025-03-17 DIAGNOSIS — C56.9 MALIGNANT NEOPLASM OF OVARY, UNSPECIFIED LATERALITY (H): Primary | ICD-10-CM

## 2025-03-17 PROCEDURE — G0463 HOSPITAL OUTPT CLINIC VISIT: HCPCS | Performed by: NURSE PRACTITIONER

## 2025-03-17 ASSESSMENT — PAIN SCALES - GENERAL: PAINLEVEL_OUTOF10: NO PAIN (0)

## 2025-03-17 NOTE — PROGRESS NOTES
"            Follow Up Notes on Referred Patient    Date: 3/17/2025      RE: Nohemy Gurrola  : 1945  LORENA: 3/17/2025        Nohemy Gurrola is a 79 year old woman with a history of recurrent stage IIIc ovarian cancer; treated in  and . She is here for a surveillance visit.     Oncology History:     Ms Gurrola had an open TLH-BSO in  due to endometriosis and was on premarin for 19 years until  when she was taken off. She was originally diagnosed with stage IIIc ovarian cancer in  - timeline below. She takes a baby aspirin daily. No fx of ovarian or breast cancer. No hx of diabetes, heart or lung disease. She is an only child and does not have any children. She has taken miralax for radiation proctitis.       - dianosed with stage IIIc ovarian cancer  - presented with blood clot, CT scan revealed right sided pelvic mass measuring 7 x 6 cm with obstruction of ureter.   - CEA    - November: removal of pelvic mass, 2/5 lymph nodes revealed metastatic adenocarcinoma, no lymphvascular space invasion   - treated surgically and with chemo (carbo/taxol) with Dr. More in [place].   - initial   1348     [id number]: disease free     2009:  increased to 18.2     2009: interval increase in size of mass. Dr. More was worried that disease had recurred.      2009: diagnostic laparoscopy revealed extensive adhesions, underwent debulking and additional carbo/taxol plus radiation to pelvis      09: MRI pelvis Impression:  1. Right sided 3.3 cm pelvic mass worrisome for recurrent disease  2. Solitary prominent 13mm right common iliac artery lymph node  09: right pelvic sidewall mass - biopsy revealed adipose tissue with metastatic adenocarcinoma consistent with given clinical history of patient's carcinoma of ovary.      Summer : had severe UTI and e. Coli infection. CT scan done in [place] which revealed \"small spot\" in pelvis, not representative of recurrent " disease.  was 10.6 at that time.     December 2016: Moved back to [place]     [facility] was 26 which is higher than usual for her     3/7/2017: CT CAP Impression:  1. No evidence of residual/recurrent disease in chest, abdomen and pelvis.   2. 1.3 cm nodule left lobe of thyroid which may be evaluated by US thyroid if not done in the past.      4/11/17: pathology consult   FINAL DIAGNOSIS:   CASE FROM [facility], [place], [place] ([id number], NEED TO VERIFY DATE OBTAINED):     SOFT TISSUE, RIGHT PELVIC SIDEWALL MASS, BIOPSY:   - Adenocarcinoma (see comment)   - One reactive lymph node negative for carcinoma (0/1)   COMMENT:   Tissue sections show adenocarcinoma that is morphologically compatible with endometrioid morphology.  The adenocarcinoma involves mesothelial-lined fibroadipose tissue and a portion of an uninvolved lymph node is also present.  No lymph-vascular invasion is identified.     5/24/18:  12.   5/21/19:  15.  6/5/2020:  14.  11/16/22:  17 ([facility])  12/5/22:  15   1/2024:  19 ([facility])     1/2024: CT ap ([facility] per patient TUYET (Unable to view)  2/1/24: MR Elastography. I personally reviewed  Impression     1.  1.7 x 1.4 cm lesion along the capsule of the inferior right hepatic lobe near the hepatorenal space demonstrates no abnormal enhancement and no restricted diffusion. These features combined with intermediate T1 and T2 signal suggests a complex cyst that may be either postinflammatory or posttraumatic. Due to the history of ovarian cancer, which can have a predilection for hepatic capsule distribution, recommend a follow-up study in 6 months to confirm stability.  2.  Benign right hepatic lobe cyst measuring 9 mm.  3.  Benign cyst in the upper pole of the left kidney. No follow-up required.  4.  Diffuse biliary dilatation similar to 2016 at is likely in large part due to reservoir effect status post cholecystectomy.     3/11/24:   18.   3/6/25:  16.      Today she comes to clinic and denies any concerns for her visit. She denies any vaginal bleeding, no changes in her bowel or bladder habits, no nausea/emesis, no lower extremity edema, and no difficulties eating or sleeping. She denies any abdominal discomfort/bloating, no fevers or chills, and no chest pain or shortness of breath. She is planning a trip to [facility] with a [employer] group and is looking forward to this.     Was having recurrent UTIs; now on Demanose  Had norovirus late Dec  Annual exam with PCP: 12/24 per patient  Mammogram:10/24 per patient  Colonoscopy:1/14      Review of Systems  See HPI      Past Medical History:    Past Medical History:   Diagnosis Date    DVT (deep vein thrombosis) in pregnancy 2004    left saphenous vein    Endometriosis     Heartburn     Hemorrhoids     Hyperlipidemia     Hypertension     Insomnia     Major depression     Ovarian cancer (H) 11/2004 and recurred 7/2009    Primary biliary cholangitis (H)          Past Surgical History:    Past Surgical History:   Procedure Laterality Date    APPENDECTOMY      AS TOTAL ABDOM HYSTERECTOMY  1985    for endometriosis and fibroids    BASAL CELL CARCINOMA EXCISION      CHOLECYSTECTOMY  2004    CHOLECYSTECTOMY  2004    HYSTERECTOMY  1985    LAPAROSCOPY  2009    for recurrent ovarian cancer    LAPAROTOMY EXPLORATORY  2004    for ovarian cancer    OPEN REDUCTION INTERNAL FIXATION ANKLE Left 1980    OPEN REDUCTION INTERNAL FIXATION ANKLE Left 1980    OTHER SURGICAL HISTORY Bilateral     OTHER SURGICAL HISTORYthumb joint surgery: right 2008, left 2011    OTHER SURGICAL HISTORY  02/09/2017    OTHER SURGICAL [id number] Char Abiola Touch     OVARY SURGERY  2004    lap debulking surgery     SALPINGO-OOPHORECTOMY BILATERAL Bilateral 1985    TONSILLECTOMY & ADENOIDECTOMY      TOTAL KNEE ARTHROPLASTY Right 05/02/2017    TOTAL KNEE ARTHROPLASTY Left 11/28/2017       Current Medications:     Current Outpatient  Medications   Medication Sig Dispense Refill    acetaminophen (TYLENOL) 500 MG tablet Take 1,000 mg by mouth      ASPIRIN PO Take 81 mg by mouth daily      ATORVASTATIN CALCIUM PO Take 10 mg by mouth       B Complex Vitamins (VITAMIN B COMPLEX PO) Take 1 capsule by mouth      Cholecalciferol (VITAMIN D3 PO) Take by mouth daily      ibuprofen (ADVIL/MOTRIN) 200 MG tablet Take 200 mg by mouth every 4 hours as needed for pain      LOSARTAN POTASSIUM PO Take 100 mg by mouth       LUMIGAN 0.01 % SOLN ophthalmic solution INSTILL 1 DROP IN BOTH EYES AT BEDTIME      multivitamin, therapeutic (THERA-VIT) TABS Take 1 tablet by mouth daily Reported on 3/30/2017      omeprazole (PRILOSEC) 20 MG CR capsule Take 20 mg by mouth daily       polyethylene glycol (MIRALAX/GLYCOLAX) powder TK 17 GRAMS DISSOLVED IN 6 OUNCES WATER ONCE D PRF CONSTIPATION  11    URSODIOL PO Take by mouth 2 times daily      Venlafaxine HCl (EFFEXOR PO) Take 150 mg by mouth daily       mesalamine (CANASA) 1000 MG suppository Place 1,000 mg rectally 2 times daily PRN           Allergies:        Allergies   Allergen Reactions    Ciprofloxacin Other (See Comments)     Pain in legs and feet    Adhesive Tape     Perfume         Social History:     Social History     Tobacco Use    Smoking status: Never    Smokeless tobacco: Never   Substance Use Topics    Alcohol use: No     Alcohol/week: 0.0 standard drinks of alcohol       History   Drug Use No         Family History:     The patient's family history is notable for     Family History   Problem Relation Age of Onset    Bipolar Disorder Mother     Suicide Mother          Physical Exam:     /71 (BP Location: Right arm, Patient Position: Sitting, Cuff Size: Adult Regular)   Pulse 73   Temp 98.5  F (36.9  C) (Oral)   Resp 16   Wt 89.9 kg (198 lb 4.8 oz)   SpO2 98%   BMI 31.12 kg/m    Body mass index is 31.12 kg/m .    General Appearance: healthy and alert, no distress     HEENT: no thyromegaly, no  palpable nodules or masses        Cardiovascular: regular rate and rhythm, no gallops, rubs or murmurs     Respiratory: lungs clear, no rales, rhonchi or wheezes, normal diaphragmatic excursion    Musculoskeletal: extremities non tender and without edema    Skin: no lesions or rashes     Neurological: normal gait, no gross defects     Psychiatric: appropriate mood and affect                               Hematological: normal cervical, supraclavicular and inguinal lymph nodes     Gastrointestinal:       abdomen soft, non-tender, non-distended, no organomegaly or masses    Genitourinary: External genitalia and urethral meatus appears normal.  Vagina is smooth without nodularity or masses.  Cervix  surgically absent  Bimanual exam reveal no masses, nodularity or fullness.  Recto-vaginal exam confirms these findings.      Assessment:    Nohemy Gurrola is a 79 year old woman with a history of recurrent stage IIIc ovarian cancer; treated in 2004 and 2009. She is here for a surveillance visit.    26 minutes spent on the date of the encounter doing chart review, history and exam, documentation and further activities as noted above      Plan:     1.)       Continue with annual pelvic exam and . Reviewed her most recent  which is stable/normal. Reviewed signs and symptoms for when she should contact the clinic or seek additional care. Patient to contact the clinic with any questions or concerns in the interim.  Answered all of her questions to the best of my ability.     2.) Genetic risk factors were assessed and the patient [facility].     3.) Labs and/or tests ordered include:  .     4.) Health maintenance issues addressed today include annual health maintenance and non-gynecologic issues with PCP.    TIMBO Howell, WHNP-BC, ANP-BC, AOCNP  Women's Health Nurse Practitioner  Adult Nurse Practitioner  Division of Gynecologic Oncology        CC  Patient Care  Team:  Outside, Provider as PCP - General  Winifred Pillai APRN CNP as Assigned Cancer Care Provider  SELF, REFERRED

## 2025-03-17 NOTE — NURSING NOTE
"Oncology Rooming Note    March 17, 2025 1:31 PM   Nohemy Gurrola is a 79 year old female who presents for:    Chief Complaint   Patient presents with    Oncology Clinic Visit     ovarian ca - RTN 1yr f/u     Initial Vitals: There were no vitals taken for this visit. Estimated body mass index is 32.96 kg/m  as calculated from the following:    Height as of 3/15/24: 1.7 m (5' 6.93\").    Weight as of 3/15/24: 95.3 kg (210 lb). There is no height or weight on file to calculate BSA.  No Pain (0) Comment: Data Unavailable   No LMP recorded. Patient has had a hysterectomy.  Allergies reviewed: Yes  Medications reviewed: Yes    Medications: Medication refills not needed today.  Pharmacy name entered into [email or web address]:    [id number] - [place], [place] - [address] AT [facility] [address]  [facility] - [place], [place] - [address]    Frailty Screening:   Is the patient here for a new oncology consult visit in cancer care? 2. No    PHQ9:  Did this patient require a PHQ9?: No      Clinical concerns: none       Evelina Salmeron              "

## 2025-03-17 NOTE — LETTER
3/17/2025      Nohemy Gurrola  [address]  [address]  [place] [address]      Dear Colleague,    Thank you for referring your patient, Nohemy Gurrola, to the [facility]. Please see a copy of my visit note below.                Follow Up Notes on Referred Patient    Date: 3/17/2025      RE: Nohemy Gurrola  : 1945  LORENA: 3/17/2025        Nohemy Gurrola is a 79 year old woman with a history of recurrent stage IIIc ovarian cancer; treated in  and . She is here for a surveillance visit.     Oncology History:     Ms Gurrola had an open TLH-BSO in  due to endometriosis and was on premarin for 19 years until  when she was taken off. She was originally diagnosed with stage IIIc ovarian cancer in  - timeline below. She takes a baby aspirin daily. No fx of ovarian or breast cancer. No hx of diabetes, heart or lung disease. She is an only child and does not have any children. She has taken miralax for radiation proctitis.       - dianosed with stage IIIc ovarian cancer  - presented with blood clot, CT scan revealed right sided pelvic mass measuring 7 x 6 cm with obstruction of ureter.   - CEA    - November: removal of pelvic mass, 2/5 lymph nodes revealed metastatic adenocarcinoma, no lymphvascular space invasion   - treated surgically and with chemo (carbo/taxol) with Dr. More in [place].   - initial   1348     [id number]: disease free     2009:  increased to 18.2     2009: interval increase in size of mass. Dr. More was worried that disease had recurred.      2009: diagnostic laparoscopy revealed extensive adhesions, underwent debulking and additional carbo/taxol plus radiation to pelvis      09: MRI pelvis Impression:  1. Right sided 3.3 cm pelvic mass worrisome for recurrent disease  2. Solitary prominent 13mm right common iliac artery lymph node  09: right pelvic sidewall mass - biopsy revealed adipose tissue  "with metastatic adenocarcinoma consistent with given clinical history of patient's carcinoma of ovary.      Summer 2016: had severe UTI and e. Coli infection. CT scan done in [place] which revealed \"small spot\" in pelvis, not representative of recurrent disease.  was 10.6 at that time.     December 2016: Moved back to [place]     1/17/17:  done at [facility], was 26 which is higher than usual for her     3/7/2017: CT CAP Impression:  1. No evidence of residual/recurrent disease in chest, abdomen and pelvis.   2. 1.3 cm nodule left lobe of thyroid which may be evaluated by US thyroid if not done in the past.      4/11/17: pathology consult   FINAL DIAGNOSIS:   CASE FROM [facility], [place], [place] ([id number], NEED TO VERIFY DATE OBTAINED):     SOFT TISSUE, RIGHT PELVIC SIDEWALL MASS, BIOPSY:   - Adenocarcinoma (see comment)   - One reactive lymph node negative for carcinoma (0/1)   COMMENT:   Tissue sections show adenocarcinoma that is morphologically compatible with endometrioid morphology.  The adenocarcinoma involves mesothelial-lined fibroadipose tissue and a portion of an uninvolved lymph node is also present.  No lymph-vascular invasion is identified.     5/24/18:  12.   5/21/19:  15.  6/5/2020:  14.  11/16/22:  17 ([facility])  12/5/22:  15   1/2024:  19 ([facility])     1/2024: CT ap ([facility]) per patient TUYET (Unable to view)  2/1/24: MR Elastography. I personally reviewed  Impression     1.  1.7 x 1.4 cm lesion along the capsule of the inferior right hepatic lobe near the hepatorenal space demonstrates no abnormal enhancement and no restricted diffusion. These features combined with intermediate T1 and T2 signal suggests a complex cyst that may be either postinflammatory or posttraumatic. Due to the history of ovarian cancer, which can have a predilection for hepatic capsule distribution, recommend a follow-up study in 6 months to confirm stability.  2.  Benign right " hepatic lobe cyst measuring 9 mm.  3.  Benign cyst in the upper pole of the left kidney. No follow-up required.  4.  Diffuse biliary dilatation similar to 2016 at is likely in large part due to reservoir effect status post cholecystectomy.     3/11/24:  18.   3/6/25:  16.      Today she comes to clinic and denies any concerns for her visit. She denies any vaginal bleeding, no changes in her bowel or bladder habits, no nausea/emesis, no lower extremity edema, and no difficulties eating or sleeping. She denies any abdominal discomfort/bloating, no fevers or chills, and no chest pain or shortness of breath. She is planning a trip to [facility]note 3[facility] with a knitting group and is looking forward to this.     Was having recurrent UTIs; now on Demanose  Had norovirus late Dec  Annual exam with PCP: 12/24 per patient  Mammogram:10/24 per patient  Colonoscopy:1/14      Review of Systems  See HPI      Past Medical History:    Past Medical History:   Diagnosis Date     DVT (deep vein thrombosis) in pregnancy 2004    left saphenous vein     Endometriosis      Heartburn      Hemorrhoids      Hyperlipidemia      Hypertension      Insomnia      Major depression      Ovarian cancer (H) 11/2004 and recurred 7/2009     Primary biliary cholangitis (H)          Past Surgical History:    Past Surgical History:   Procedure Laterality Date     APPENDECTOMY       AS TOTAL ABDOM HYSTERECTOMY  1985    for endometriosis and fibroids     BASAL CELL CARCINOMA EXCISION       CHOLECYSTECTOMY  2004     CHOLECYSTECTOMY  2004     HYSTERECTOMY  1985     LAPAROSCOPY  2009    for recurrent ovarian cancer     LAPAROTOMY EXPLORATORY  2004    for ovarian cancer     OPEN REDUCTION INTERNAL FIXATION ANKLE Left 1980     OPEN REDUCTION INTERNAL FIXATION ANKLE Left 1980     OTHER SURGICAL HISTORY Bilateral     OTHER SURGICAL HISTORYthumb joint surgery: right 2008, left 2011     OTHER SURGICAL HISTORY  02/09/2017    OTHER SURGICAL [id number] [facility]       OVARY SURGERY  2004    lap debulking surgery      SALPINGO-OOPHORECTOMY BILATERAL Bilateral 1985     TONSILLECTOMY & ADENOIDECTOMY       TOTAL KNEE ARTHROPLASTY Right 05/02/2017     TOTAL KNEE ARTHROPLASTY Left 11/28/2017       Current Medications:     Current Outpatient Medications   Medication Sig Dispense Refill     acetaminophen (TYLENOL) 500 MG tablet Take 1,000 mg by mouth       ASPIRIN PO Take 81 mg by mouth daily       ATORVASTATIN CALCIUM PO Take 10 mg by mouth        B Complex Vitamins (VITAMIN B COMPLEX PO) Take 1 capsule by mouth       Cholecalciferol (VITAMIN D3 PO) Take by mouth daily       ibuprofen (ADVIL/MOTRIN) 200 MG tablet Take 200 mg by mouth every 4 hours as needed for pain       LOSARTAN POTASSIUM PO Take 100 mg by mouth        LUMIGAN 0.01 % SOLN ophthalmic solution INSTILL 1 DROP IN BOTH EYES AT BEDTIME       multivitamin, therapeutic (THERA-VIT) TABS Take 1 tablet by mouth daily Reported on 3/30/2017       omeprazole (PRILOSEC) 20 MG CR capsule Take 20 mg by mouth daily        polyethylene glycol (MIRALAX/GLYCOLAX) powder TK 17 GRAMS DISSOLVED IN 6 OUNCES WATER ONCE D PRF CONSTIPATION  11     URSODIOL PO Take by mouth 2 times daily       Venlafaxine HCl (EFFEXOR PO) Take 150 mg by mouth daily        mesalamine (CANASA) 1000 MG suppository Place 1,000 mg rectally 2 times daily PRN           Allergies:        Allergies   Allergen Reactions     Ciprofloxacin Other (See Comments)     Pain in legs and feet     Adhesive Tape      Perfume         Social History:     Social History     Tobacco Use     Smoking status: Never     Smokeless tobacco: Never   Substance Use Topics     Alcohol use: No     Alcohol/week: 0.0 standard drinks of alcohol       History   Drug Use No         Family History:     The patient's family history is notable for     Family History   Problem Relation Age of Onset     Bipolar Disorder Mother      Suicide Mother          Physical Exam:     /71 (BP Location: Right  arm, Patient Position: Sitting, Cuff Size: Adult Regular)   Pulse 73   Temp 98.5  F (36.9  C) (Oral)   Resp 16   Wt 89.9 kg (198 lb 4.8 oz)   SpO2 98%   BMI 31.12 kg/m    Body mass index is 31.12 kg/m .    General Appearance: healthy and alert, no distress     HEENT: no thyromegaly, no palpable nodules or masses        Cardiovascular: regular rate and rhythm, no gallops, rubs or murmurs     Respiratory: lungs clear, no rales, rhonchi or wheezes, normal diaphragmatic excursion    Musculoskeletal: extremities non tender and without edema    Skin: no lesions or rashes     Neurological: normal gait, no gross defects     Psychiatric: appropriate mood and affect                               Hematological: normal cervical, supraclavicular and inguinal lymph nodes     Gastrointestinal:       abdomen soft, non-tender, non-distended, no organomegaly or masses    Genitourinary: External genitalia and urethral meatus appears normal.  Vagina is smooth without nodularity or masses.  Cervix  surgically absent  Bimanual exam reveal no masses, nodularity or fullness.  Recto-vaginal exam confirms these findings.      Assessment:    Nohemy Gurrola is a 79 year old woman with a history of recurrent stage IIIc ovarian cancer; treated in 2004 and 2009. She is here for a surveillance visit.    26 minutes spent on the date of the encounter doing chart review, history and exam, documentation and further activities as noted above      Plan:     1.)       Continue with annual pelvic exam and . Reviewed her most recent  which is stable/normal. Reviewed signs and symptoms for when she should contact the clinic or seek additional care. Patient to contact the clinic with any questions or concerns in the interim.  Answered all of her questions to the best of my ability.     2.) Genetic risk factors were assessed and the patient states [facility].     3.) Labs and/or tests ordered include:  CA  125.     4.) Health maintenance issues addressed today include annual health maintenance and non-gynecologic issues with PCP.    TIMBO Howell, WHNP-BC, ANP-BC, AOCNP  Women's Health Nurse Practitioner  Adult Nurse Practitioner  Division of Gynecologic Oncology        CC  Patient Care Team:  Outside, Provider as PCP - General  Winifred Pillai APRN CNP as Assigned Cancer Care Provider  SELF, REFERRED    Again, thank you for allowing me to participate in the care of your patient.        Sincerely,        TIMBO Goddard CNP    Electronically signed

## 2025-07-19 ENCOUNTER — HEALTH MAINTENANCE LETTER (OUTPATIENT)
Age: 80
End: 2025-07-19